# Patient Record
Sex: FEMALE | Race: WHITE | Employment: FULL TIME | URBAN - METROPOLITAN AREA
[De-identification: names, ages, dates, MRNs, and addresses within clinical notes are randomized per-mention and may not be internally consistent; named-entity substitution may affect disease eponyms.]

---

## 2017-06-28 PROBLEM — O42.90 PREMATURE RUPTURE OF MEMBRANES: Status: ACTIVE | Noted: 2017-06-28

## 2017-07-26 ENCOUNTER — HOSPITAL ENCOUNTER (OUTPATIENT)
Dept: GENERAL RADIOLOGY | Age: 22
Discharge: HOME | End: 2017-07-26
Attending: INTERNAL MEDICINE | Admitting: INTERNAL MEDICINE

## 2017-07-26 LAB
T4 FREE: 1.28 NG/DL (ref 0.9–1.8)
TSH HIGH SENSITIVITY: 2.4 UIU/ML (ref 0.27–4.2)

## 2017-11-10 ENCOUNTER — HOSPITAL ENCOUNTER (OUTPATIENT)
Dept: GENERAL RADIOLOGY | Age: 22
Discharge: HOME | End: 2017-11-10
Attending: INTERNAL MEDICINE | Admitting: INTERNAL MEDICINE

## 2017-11-10 LAB
T4 FREE: 1.22 NG/DL (ref 0.9–1.8)
TSH HIGH SENSITIVITY: 5.51 UIU/ML (ref 0.27–4.2)

## 2018-03-22 ENCOUNTER — HOSPITAL ENCOUNTER (OUTPATIENT)
Dept: GENERAL RADIOLOGY | Age: 23
Discharge: HOME | End: 2018-03-22
Attending: INTERNAL MEDICINE | Admitting: INTERNAL MEDICINE

## 2018-03-22 LAB
T4 FREE: 1.49 NG/DL (ref 0.9–1.8)
TSH HIGH SENSITIVITY: 0.35 UIU/ML (ref 0.27–4.2)

## 2019-10-04 RX ORDER — LEVOTHYROXINE SODIUM 112 UG/1
112 TABLET ORAL DAILY
COMMUNITY
End: 2021-01-25 | Stop reason: ALTCHOICE

## 2020-08-31 ENCOUNTER — OFFICE VISIT (OUTPATIENT)
Dept: FAMILY MEDICINE CLINIC | Age: 25
End: 2020-08-31
Payer: COMMERCIAL

## 2020-08-31 VITALS
OXYGEN SATURATION: 98 % | DIASTOLIC BLOOD PRESSURE: 64 MMHG | BODY MASS INDEX: 21.55 KG/M2 | HEIGHT: 63 IN | WEIGHT: 121.6 LBS | TEMPERATURE: 98.8 F | HEART RATE: 101 BPM | SYSTOLIC BLOOD PRESSURE: 106 MMHG

## 2020-08-31 PROCEDURE — 99214 OFFICE O/P EST MOD 30 MIN: CPT | Performed by: PHYSICIAN ASSISTANT

## 2020-08-31 RX ORDER — AMITRIPTYLINE HYDROCHLORIDE 25 MG/1
25 TABLET, FILM COATED ORAL NIGHTLY
Qty: 30 TABLET | Refills: 5 | Status: SHIPPED | OUTPATIENT
Start: 2020-08-31 | End: 2021-02-08

## 2020-08-31 ASSESSMENT — ENCOUNTER SYMPTOMS
VOMITING: 0
DIARRHEA: 0
NAUSEA: 0
EYE PAIN: 0
RHINORRHEA: 0
SHORTNESS OF BREATH: 0
COUGH: 0
ABDOMINAL PAIN: 0
SORE THROAT: 0
CONSTIPATION: 0

## 2020-08-31 ASSESSMENT — PATIENT HEALTH QUESTIONNAIRE - PHQ9
SUM OF ALL RESPONSES TO PHQ QUESTIONS 1-9: 0
SUM OF ALL RESPONSES TO PHQ QUESTIONS 1-9: 0
SUM OF ALL RESPONSES TO PHQ9 QUESTIONS 1 & 2: 0
1. LITTLE INTEREST OR PLEASURE IN DOING THINGS: 0
2. FEELING DOWN, DEPRESSED OR HOPELESS: 0

## 2020-08-31 NOTE — PROGRESS NOTES
8/31/2020    [facility]    Chief Complaint   Patient presents with    Annual Exam       HPI  History obtained from the patient. [facility] is a 25 y.o. female who presents today for annual exam.    Hypothyroidism - Compliant with Levothyroxine 112 mcg. She sees endocrinologist, Dr. Neha Ellsworth, every 3 months to get thyroid checked. Denies constipation, cold intolerance, and hair or nail changes. Health maintenance - Varicella vaccine, HPV vaccine, Pap smear  HIV screen, Chlamydia screen  She had a baby on June 23, 2019. Hasn't been back to OB since then. Migraines -the patient complains of frequent migraines, worst over her left temple. She states that she gets these migraines once or twice weekly, every week, and it has been this way for years. She states that she does have a history of impacted wisdom teeth, and she is working on scheduling an appointment to get these removed. No history of TMD.  She has an eye exam regularly. She states that when she gets a migraine, she takes 800 mg of Motrin, but \"the only thing that helps is sleep. \"  No association with time of day. REVIEW OF SYMPTOMS  Review of Systems   Constitutional: Negative for chills and fever. HENT: Negative for ear pain, rhinorrhea and sore throat. Eyes: Negative for pain and visual disturbance. Respiratory: Negative for cough and shortness of breath. Cardiovascular: Positive for palpitations. Negative for chest pain. Lowered synthroid and this resolved these   Gastrointestinal: Negative for abdominal pain, constipation, diarrhea, nausea and vomiting. Genitourinary: Negative for dysuria, frequency and urgency. Skin: Negative for rash. Neurological: Negative for dizziness and syncope. Psychiatric/Behavioral: Negative for suicidal ideas. The patient is not nervous/anxious.         PAST MEDICAL HISTORY  Past Medical History:   Diagnosis Date    Thyroid disease     hypothyroid       FAMILY HISTORY  Family History Problem Relation Age of Onset    High Blood Pressure Mother     Heart Disease Maternal Grandmother     High Blood Pressure Maternal Grandmother     Heart Attack Maternal Grandmother     Hypertension Maternal Grandfather     Cancer Maternal Grandfather         LEUKEMIA    Diabetes Maternal Grandfather        SOCIAL HISTORY  Social History     Socioeconomic History    Marital status: Single     Spouse name: None    Number of children: None    Years of education: None    Highest education level: None   Occupational History    None   Social Needs    Financial resource strain: None    Food insecurity     Worry: None     Inability: None    Transportation needs     Medical: None     Non-medical: None   Tobacco Use    Smoking status: Never Smoker    Smokeless tobacco: Never Used   Substance and Sexual Activity    Alcohol use: No    Drug use: No    Sexual activity: Yes   Lifestyle    Physical activity     Days per week: None     Minutes per session: None    Stress: None   Relationships    Social connections     Talks on phone: None     Gets together: None     Attends Mu-ism service: None     Active member of club or organization: None     Attends meetings of clubs or organizations: None     Relationship status: None    Intimate partner violence     Fear of current or ex partner: None     Emotionally abused: None     Physically abused: None     Forced sexual activity: None   Other Topics Concern    None   Social History Narrative    None        SURGICAL HISTORY  No past surgical history on file.     CURRENT MEDICATIONS  Current Outpatient Medications   Medication Sig Dispense Refill    amitriptyline (ELAVIL) 25 MG tablet Take 1 tablet by mouth nightly 30 tablet 5    levothyroxine (SYNTHROID) 112 MCG tablet Take 112 mcg by mouth Daily      ibuprofen (ADVIL;MOTRIN) 800 MG tablet Take 1 tablet by mouth every 8 hours as needed for Pain or Fever 30 tablet 2     No current facility-administered medications for this visit. ALLERGIES  No Known Allergies    RECENT LABS    No results found for: LABA1C  No results found for: EAG    No results found for: CHOL  No results found for: LDLCHOLESTEROL, [facility]    Lab Results   Component Value Date    WBC 22.3 (H) 06/28/2017    HGB 12.3 (L) 06/28/2017    HCT 34.9 (L) 06/28/2017    MCV 91.6 06/28/2017     (L) 06/28/2017       PHYSICAL EXAM  /64 (Site: Left Upper Arm, Position: Sitting, Cuff Size: Medium Adult)   Pulse 101   Temp 98.8 °F (37.1 °C)   Ht 5' 3\" (1.6 m)   Wt 121 lb 9.6 oz (55.2 kg)   LMP 08/10/2020 (Approximate)   SpO2 98%   BMI 21.54 kg/m²     Physical Exam  Constitutional:       Appearance: Normal appearance. HENT:      Head: Normocephalic and atraumatic. Eyes:      Comments: EOM grossly intact. Cardiovascular:      Rate and Rhythm: Normal rate and regular rhythm. Heart sounds: No murmur. No friction rub. No gallop. Pulmonary:      Effort: Pulmonary effort is normal.      Breath sounds: Normal breath sounds. No wheezing, rhonchi or rales. Skin:     General: Skin is warm and dry. Neurological:      Mental Status: She is alert and oriented to person, place, and time. Comments: Cranial nerves II-XII grossly intact   Psychiatric:         Mood and Affect: Mood normal.         Behavior: Behavior normal.         ASSESSMENT & PLAN  1. Hypothyroidism, unspecified type  We will update lab work. - TSH without Reflex; Future  - T4, Free; Future  - Comprehensive Metabolic Panel; Future  - CBC Auto Differential; Future  - Lipid Panel; Future    2. Migraine without status migrainosus, not intractable, unspecified migraine type  I explained to the patient the difference between abortive and preventative treatment for migraines, and I explained that more than 4 migraines per month warrants consideration of preventative therapy. I discussed various options, and the patient would like to try amitriptyline.   We will follow-up with her in 4 to 6 weeks to see how she is doing on this. - amitriptyline (ELAVIL) 25 MG tablet; Take 1 tablet by mouth nightly  Dispense: 30 tablet; Refill: 5    3. Screening for human immunodeficiency virus  Health maintenance requirement.  - HIV Screen; Future    4. Screening for chlamydial disease  Health maintenance requirement. - Chlamydia trachomatis DNA, Urine; Future          Return in about 6 weeks (around 10/12/2020) for Follow Up on New Migraine Tx.             Electronically signed by Antonio Hardin PA-C on 8/31/2020

## 2020-09-19 ENCOUNTER — HOSPITAL ENCOUNTER (OUTPATIENT)
Age: 25
Discharge: HOME | End: 2020-09-19
Payer: COMMERCIAL

## 2020-09-19 LAB
ALBUMIN SERPL-MCNC: 4.8 GM/DL (ref 3.4–5)
ALP BLD-CCNC: 57 IU/L (ref 40–128)
ALT SERPL-CCNC: 10 U/L (ref 10–40)
ANION GAP SERPL CALCULATED.3IONS-SCNC: 9 MMOL/L (ref 4–16)
AST SERPL-CCNC: 13 IU/L (ref 15–37)
BASOPHILS ABSOLUTE: 0.1 K/CU MM
BASOPHILS RELATIVE PERCENT: 1 % (ref 0–1)
BILIRUB SERPL-MCNC: 0.5 MG/DL (ref 0–1)
BUN BLDV-MCNC: 13 MG/DL (ref 6–23)
CALCIUM SERPL-MCNC: 9.4 MG/DL (ref 8.3–10.6)
CHLORIDE BLD-SCNC: 103 MMOL/L (ref 99–110)
CHOLESTEROL: 167 MG/DL
CO2: 26 MMOL/L (ref 21–32)
CREAT SERPL-MCNC: 0.8 MG/DL (ref 0.6–1.1)
DIFFERENTIAL TYPE: ABNORMAL
EOSINOPHILS ABSOLUTE: 0.2 K/CU MM
EOSINOPHILS RELATIVE PERCENT: 2.5 % (ref 0–3)
GFR AFRICAN AMERICAN: >60 ML/MIN/1.73M2
GFR NON-AFRICAN AMERICAN: >60 ML/MIN/1.73M2
GLUCOSE BLD-MCNC: 80 MG/DL (ref 70–99)
HCT VFR BLD CALC: 43.6 % (ref 37–47)
HDLC SERPL-MCNC: 71 MG/DL
HEMOGLOBIN: 14.4 GM/DL (ref 12.5–16)
IMMATURE NEUTROPHIL %: 0.2 % (ref 0–0.43)
LDL CHOLESTEROL DIRECT: 86 MG/DL
LYMPHOCYTES ABSOLUTE: 2.8 K/CU MM
LYMPHOCYTES RELATIVE PERCENT: 47 % (ref 24–44)
MCH RBC QN AUTO: 31.4 PG (ref 27–31)
MCHC RBC AUTO-ENTMCNC: 33 % (ref 32–36)
MCV RBC AUTO: 95.2 FL (ref 78–100)
MONOCYTES ABSOLUTE: 0.4 K/CU MM
MONOCYTES RELATIVE PERCENT: 7.1 % (ref 0–4)
NUCLEATED RBC %: 0 %
PDW BLD-RTO: 11.8 % (ref 11.7–14.9)
PLATELET # BLD: 186 K/CU MM (ref 140–440)
PMV BLD AUTO: 12.7 FL (ref 7.5–11.1)
POTASSIUM SERPL-SCNC: 4.3 MMOL/L (ref 3.5–5.1)
RBC # BLD: 4.58 M/CU MM (ref 4.2–5.4)
SEGMENTED NEUTROPHILS ABSOLUTE COUNT: 2.5 K/CU MM
SEGMENTED NEUTROPHILS RELATIVE PERCENT: 42.2 % (ref 36–66)
SODIUM BLD-SCNC: 138 MMOL/L (ref 135–145)
T4 FREE: 1.51 NG/DL (ref 0.9–1.8)
TOTAL IMMATURE NEUTOROPHIL: 0.01 K/CU MM
TOTAL NUCLEATED RBC: 0 K/CU MM
TOTAL PROTEIN: 7.1 GM/DL (ref 6.4–8.2)
TRIGL SERPL-MCNC: 48 MG/DL
TSH HIGH SENSITIVITY: 2.32 UIU/ML (ref 0.27–4.2)
WBC # BLD: 5.9 K/CU MM (ref 4–10.5)

## 2020-09-19 PROCEDURE — 84443 ASSAY THYROID STIM HORMONE: CPT

## 2020-09-19 PROCEDURE — 36415 COLL VENOUS BLD VENIPUNCTURE: CPT

## 2020-09-19 PROCEDURE — 87591 N.GONORRHOEAE DNA AMP PROB: CPT

## 2020-09-19 PROCEDURE — 87491 CHLMYD TRACH DNA AMP PROBE: CPT

## 2020-09-19 PROCEDURE — 87389 HIV-1 AG W/HIV-1&-2 AB AG IA: CPT

## 2020-09-19 PROCEDURE — 85025 COMPLETE CBC W/AUTO DIFF WBC: CPT

## 2020-09-19 PROCEDURE — 80061 LIPID PANEL: CPT

## 2020-09-19 PROCEDURE — 80053 COMPREHEN METABOLIC PANEL: CPT

## 2020-09-19 PROCEDURE — 83721 ASSAY OF BLOOD LIPOPROTEIN: CPT

## 2020-09-19 PROCEDURE — 84439 ASSAY OF FREE THYROXINE: CPT

## 2020-09-19 PROCEDURE — 87110 CHLAMYDIA CULTURE: CPT

## 2020-09-20 LAB — HIV SCREEN: NON REACTIVE

## 2020-09-22 NOTE — RESULT ENCOUNTER NOTE
Please call the patient let her know that I reviewed the results of her blood work. Her thyroid levels were normal, her blood count and metabolic panel looked good.   Thanks,  [place]

## 2020-09-23 LAB
CHLAMYDIA TRACHOMATIS AMPLIFIED DET: NEGATIVE
N GONORRHOEAE AMPLIFIED DET: NEGATIVE

## 2020-10-28 ENCOUNTER — TELEPHONE (OUTPATIENT)
Dept: FAMILY MEDICINE CLINIC | Age: 25
End: 2020-10-28

## 2021-01-25 ENCOUNTER — OFFICE VISIT (OUTPATIENT)
Dept: FAMILY MEDICINE CLINIC | Age: 26
End: 2021-01-25
Payer: COMMERCIAL

## 2021-01-25 VITALS
BODY MASS INDEX: 21.33 KG/M2 | DIASTOLIC BLOOD PRESSURE: 68 MMHG | HEART RATE: 106 BPM | WEIGHT: 120.4 LBS | TEMPERATURE: 98.2 F | SYSTOLIC BLOOD PRESSURE: 110 MMHG | OXYGEN SATURATION: 98 % | HEIGHT: 63 IN

## 2021-01-25 DIAGNOSIS — R00.2 PALPITATIONS: Primary | ICD-10-CM

## 2021-01-25 DIAGNOSIS — R00.0 TACHYCARDIA: ICD-10-CM

## 2021-01-25 DIAGNOSIS — G43.909 MIGRAINE WITHOUT STATUS MIGRAINOSUS, NOT INTRACTABLE, UNSPECIFIED MIGRAINE TYPE: ICD-10-CM

## 2021-01-25 PROCEDURE — 1111F DSCHRG MED/CURRENT MED MERGE: CPT | Performed by: PHYSICIAN ASSISTANT

## 2021-01-25 PROCEDURE — 99214 OFFICE O/P EST MOD 30 MIN: CPT | Performed by: PHYSICIAN ASSISTANT

## 2021-01-25 RX ORDER — LEVOTHYROXINE SODIUM 112 UG/1
1 TABLET ORAL DAILY
COMMUNITY
End: 2021-03-12 | Stop reason: SDUPTHER

## 2021-01-25 RX ORDER — DILTIAZEM HYDROCHLORIDE 120 MG/1
1 CAPSULE, COATED, EXTENDED RELEASE ORAL DAILY
COMMUNITY
Start: 2021-01-20 | End: 2021-02-08 | Stop reason: DRUGHIGH

## 2021-01-25 RX ORDER — SUMATRIPTAN 100 MG/1
100 TABLET, FILM COATED ORAL PRN
Qty: 9 TABLET | Refills: 0 | Status: SHIPPED | OUTPATIENT
Start: 2021-01-25 | End: 2021-02-08

## 2021-01-25 ASSESSMENT — PATIENT HEALTH QUESTIONNAIRE - PHQ9
SUM OF ALL RESPONSES TO PHQ QUESTIONS 1-9: 0
SUM OF ALL RESPONSES TO PHQ9 QUESTIONS 1 & 2: 0
1. LITTLE INTEREST OR PLEASURE IN DOING THINGS: 0

## 2021-01-25 NOTE — PROGRESS NOTES
1/25/2021    Milo Miramontes    Chief Complaint   Patient presents with    Follow-Up from Hospital     DX sinus tachacardia, 1-19 thru 1-20 , [facility] , EKG , pt states her HR flucuates suddenly       HPI  History obtained from the patient. Anjel Ayers is a 22 y.o. female who presents today for hospital follow up. The patient presented with intermittent palpitations X 2-3 weeks, as well as heaviness in her chest and SOB with these episodes and was admitted 1/19/21 - 1/21/21. Her EKG showed sinus tachycardia with a rate of 168. The patient notes that she's had palpitations for the last few weeks, but these become significantly more frequent and more uncomfortable, which is what brought her to the ED. They preformed an echo which was unremarkable. The patient was started on Diltiazem 120 mg ER daily and she has not had any episodes with chest heaviness since, though \"I do feel a little bit of the heart flutters still\" intermittently. She was instructed to stop drinking caffeine and to drink plenty of water, so she notes that she's not had any caffeine for the last few days. The patient notes that she stopped taking amitriptyline because it upset her stomach at night. She does still get migraines frequently, about the same rate as before trying the amitriptyline. REVIEW OF SYMPTOMS  Review of Systems   Constitutional: Negative for chills and fever. Respiratory: Negative for cough and shortness of breath. Gastrointestinal: Negative for diarrhea and vomiting.      PAST MEDICAL HISTORY  Past Medical History:   Diagnosis Date    Thyroid disease     hypothyroid       FAMILY HISTORY  Family History   Problem Relation Age of Onset    High Blood Pressure Mother     Heart Disease Maternal Grandmother     High Blood Pressure Maternal Grandmother     Heart Attack Maternal Grandmother     Hypertension Maternal Grandfather     Cancer Maternal Grandfather         LEUKEMIA    Diabetes Maternal Grandfather SOCIAL HISTORY  Social History     Socioeconomic History    Marital status: Single     Spouse name: Not on file    Number of children: Not on file    Years of education: Not on file    Highest education level: Not on file   Occupational History    Not on file   Social Needs    Financial resource strain: Not on file    Food insecurity     Worry: Not on file     Inability: Not on file    Transportation needs     Medical: Not on file     Non-medical: Not on file   Tobacco Use    Smoking status: Never Smoker    Smokeless tobacco: Never Used   Substance and Sexual Activity    Alcohol use: No    Drug use: No    Sexual activity: Yes   Lifestyle    Physical activity     Days per week: Not on file     Minutes per session: Not on file    Stress: Not on file   Relationships    Social connections     Talks on phone: Not on file     Gets together: Not on file     Attends Sabianist service: Not on file     Active member of club or organization: Not on file     Attends meetings of clubs or organizations: Not on file     Relationship status: Not on file    Intimate partner violence     Fear of current or ex partner: Not on file     Emotionally abused: Not on file     Physically abused: Not on file     Forced sexual activity: Not on file   Other Topics Concern    Not on file   Social History Narrative    Not on file        SURGICAL HISTORY  No past surgical history on file. CURRENT MEDICATIONS  Current Outpatient Medications   Medication Sig Dispense Refill    dilTIAZem (CARDIZEM CD) 120 MG extended release capsule Take 1 capsule by mouth daily      levothyroxine (UNITHROID) 112 MCG tablet Take 1 tablet by mouth daily      SUMAtriptan (IMITREX) 100 MG tablet Take 1 tablet by mouth as needed for Migraine Administer at first sign of headache. If initial dose was partially effective or headache recurs, may repeat a dose 2 hours later.  Limit use to no more than 10 days of use per month to avoid medication overuse headache. 9 tablet 0    ibuprofen (ADVIL;MOTRIN) 800 MG tablet Take 1 tablet by mouth every 8 hours as needed for Pain or Fever 30 tablet 2    amitriptyline (ELAVIL) 25 MG tablet Take 1 tablet by mouth nightly (Patient not taking: Reported on 1/25/2021) 30 tablet 5     No current facility-administered medications for this visit. ALLERGIES  No Known Allergies    RECENT LABS    No results found for: LABA1C  No results found for: EAG    Lab Results   Component Value Date    CHOL 167 09/19/2020     No results found for: Екатерина More [address]    Lab Results   Component Value Date    WBC 5.9 09/19/2020    HGB 14.4 09/19/2020    HCT 43.6 09/19/2020    MCV 95.2 09/19/2020     09/19/2020       PHYSICAL EXAM  /68   Pulse 106   Temp 98.2 °F (36.8 °C)   Ht 5' 3\" (1.6 m)   Wt 120 lb 6.4 oz (54.6 kg)   SpO2 98%   BMI 21.33 kg/m²     Physical Exam  Constitutional:       Appearance: Normal appearance. HENT:      Head: Normocephalic and atraumatic. Eyes:      Comments: EOM grossly intact. Cardiovascular:      Rate and Rhythm: Normal rate and regular rhythm. Heart sounds: No murmur. No friction rub. No gallop. Pulmonary:      Effort: Pulmonary effort is normal.      Breath sounds: Normal breath sounds. No wheezing, rhonchi or rales. Skin:     General: Skin is warm and dry. Neurological:      Mental Status: She is alert and oriented to person, place, and time. Comments: Cranial nerves II-XII grossly intact   Psychiatric:         Mood and Affect: Mood normal.         Behavior: Behavior normal.         ASSESSMENT & PLAN  1. Palpitations  Referred the patient to cardiology for evaluation. I answered the patients questions and explained that a [place] monitor might be one of their first steps in evaluation.  Encouraged her to inquire about propranolol, as this medication might control her rate and also provide benefits of migraine prevention.   - Ambulatory referral to

## 2021-01-26 ENCOUNTER — TELEPHONE (OUTPATIENT)
Dept: CARDIOLOGY CLINIC | Age: 26
End: 2021-01-26

## 2021-02-01 ENCOUNTER — TELEPHONE (OUTPATIENT)
Dept: CARDIOLOGY CLINIC | Age: 26
End: 2021-02-01

## 2021-02-08 ENCOUNTER — INITIAL CONSULT (OUTPATIENT)
Dept: CARDIOLOGY CLINIC | Age: 26
End: 2021-02-08
Payer: COMMERCIAL

## 2021-02-08 VITALS
HEIGHT: 63 IN | HEART RATE: 105 BPM | DIASTOLIC BLOOD PRESSURE: 72 MMHG | SYSTOLIC BLOOD PRESSURE: 110 MMHG | WEIGHT: 118.2 LBS | BODY MASS INDEX: 20.94 KG/M2

## 2021-02-08 DIAGNOSIS — R00.0 TACHYCARDIA: Primary | ICD-10-CM

## 2021-02-08 PROCEDURE — G8484 FLU IMMUNIZE NO ADMIN: HCPCS | Performed by: INTERNAL MEDICINE

## 2021-02-08 PROCEDURE — 1036F TOBACCO NON-USER: CPT | Performed by: INTERNAL MEDICINE

## 2021-02-08 PROCEDURE — G8427 DOCREV CUR MEDS BY ELIG CLIN: HCPCS | Performed by: INTERNAL MEDICINE

## 2021-02-08 PROCEDURE — 99204 OFFICE O/P NEW MOD 45 MIN: CPT | Performed by: INTERNAL MEDICINE

## 2021-02-08 PROCEDURE — G8420 CALC BMI NORM PARAMETERS: HCPCS | Performed by: INTERNAL MEDICINE

## 2021-02-08 RX ORDER — DILTIAZEM HYDROCHLORIDE 240 MG/1
240 CAPSULE, COATED, EXTENDED RELEASE ORAL DAILY
Qty: 30 CAPSULE | Refills: 0 | Status: SHIPPED | OUTPATIENT
Start: 2021-02-08 | End: 2021-03-09 | Stop reason: ALTCHOICE

## 2021-02-08 RX ORDER — DILTIAZEM HYDROCHLORIDE 120 MG/1
240 CAPSULE, COATED, EXTENDED RELEASE ORAL DAILY
Qty: 30 CAPSULE | Refills: 3 | Status: SHIPPED
Start: 2021-02-08 | End: 2021-02-08 | Stop reason: SDUPTHER

## 2021-02-08 NOTE — LETTER
Vern Miramontes  1995  [id number]    Have you had any Chest Pain that is not new? - No    ? DO EKG IF: Patient has a Heart Rate above 100 or below 40     CAD (Coronary Artery Disease) patient should have one on file every 6 months        Have you had any Shortness of Breath - No      Have you had any dizziness - No      ? Sitting wait 5 minutes do supine (laying down) wait 5 minutes then do standing - log each in \"vitals\" area in Epic  ? Be sure to ask what symptoms they are having if they get dizzy while completing ortho stats such as room spinning, nausea, etc.    Have you had any palpitations that are not new?  - Yes  If Yes DO EKG - Do you feel your heart fluttering  How long does it last -   seconds       Do you have any edema - No       Do you have a surgery or procedure scheduled in the near future - No

## 2021-02-08 NOTE — PROGRESS NOTES
Dariela Sousa  is a  New patient  ,22 y.o.   female here for evaluation of the following chief complaint(s):    tachycardia        SUBJECTIVE/OBJECTIVE:  HPI : h/o  tachycardia now here  [facility] Review of Systems    palpitations    Vitals:    02/08/21 1530   BP: 110/72   Pulse: 105   Weight: 118 lb 3.2 oz (53.6 kg)   Height: 5' 3\" (1.6 m)     /72   Pulse 105   Ht 5' 3\" (1.6 m)   Wt 118 lb 3.2 oz (53.6 kg)   BMI 20.94 kg/m²   No flowsheet data found. Wt Readings from Last 3 Encounters:   02/08/21 118 lb 3.2 oz (53.6 kg)   01/25/21 120 lb 6.4 oz (54.6 kg)   08/31/20 121 lb 9.6 oz (55.2 kg)     Body mass index is 20.94 kg/m². Physical Exam     Neck: JVD     Lungs : clear    Cardio : Si and S2 audilble      Ext: edema      All pertinent data reviewed      Meds : reviewed     1/21  1. Normal left ventricle size and systolic function; estimated LVEF   60-65%. Normal diastolic parameters. 2. Normal right ventricle size and systolic function. 3. No hemodynamically significant valvular disease. 4. No pericardial effusion. Tests ordered        ASSESSMENT/PLAN:    - tachycardia: on CCB, ETT    increase CCB   seenn by EP at [address]  is a 22 y.o. female with a past medical history of hypothyroidism on 112 mcg levothyroxine. Karena David originally presented to [facility] on 1/19/2020. She presents with 2 weeks of intermittent palpitations. Her only symptoms associated with her palpitations are chest pain and shortness of breath. She had a negative troponin times 2 and a normal D-dimer. Her thyroid function tests were normal. She had palpitations while in the CDU and per telemetry had sinus tachycardia at the time that gradually increased and gradually decreased. The P wave morphology is similar at both lower and higher heart rates. The EKG during tachycardia shows sinus tachycardia with a rate of 168.  She denies any drug use. She does have a family history of atrial fibrillation. Her tachycardia improved with IV fluids. Patent states she was sitting in bed last evening when the event occurred. She felt her typical symptoms during that period. She denies any personal or family history of cardiac disease. She denies any sudden cardiac death in her family. ASSESSMENT AND PLAN   Sandee Aldridge is a 22 y.o. female who has the following active issues that are listed below:    1. Palpitations-sinus tachycardia on telemetry and EKG. EKG shows sinus tachycardia rate 168. 2. Hypothyroidism on 112 mcg levothyroxine    Recommendations  -Obtain echocardiogram to assess LV function and ensure structurally normal heart  -Recommend Diltiazem 120 mg daily for suppression of sinus tachycardia events   -Discussed with patient to avoid alcohol and caffeine, encouraged aggressive hydration, increase salt intake, and frequent intake of small meals. This consult was discussed with Dr. Reyes Poles the attending physician. If you have any questions or need any further information, please feel free to contact the EP Consult Service. Thank you for allowing us to participate in the care of Sandee Aldridge. Mercedes Hinojosa MD  Fellow   Cardiac Electrophysiology       - on synthoid    An electronic signature was used to authenticate this note.     --Margaret Meckel, MD

## 2021-02-16 ENCOUNTER — NURSE ONLY (OUTPATIENT)
Dept: CARDIOLOGY CLINIC | Age: 26
End: 2021-02-16
Payer: COMMERCIAL

## 2021-02-16 DIAGNOSIS — R00.0 TACHYCARDIA: Primary | ICD-10-CM

## 2021-02-16 PROCEDURE — 93242 EXT ECG>48HR<7D RECORDING: CPT | Performed by: INTERNAL MEDICINE

## 2021-02-16 NOTE — PROGRESS NOTES
72 hour holter monitor Dinorah@[employer] PM  for Tachycardia  Serial # [id number] . Instructed patient on monitor and proper use. Instructed on diary. When to remove and bring it back. Must leave the holter monitor on  without removing for the duration of time ordered. Answered all questions the patient had. Instructed patient to call [facility] at [phone] with any questions or concerns with the monitor.

## 2021-02-19 NOTE — TELEPHONE ENCOUNTER
Pt wants refill on Diltiazem 120mg. She has never had to take the 240 mg  Ov 3/21 pt is out. Call to [facility] on [address]   Call pt to let her know. If Dr will not call in 120 mg she will need 240 mg due to new pharm.

## 2021-02-25 ENCOUNTER — PROCEDURE VISIT (OUTPATIENT)
Dept: CARDIOLOGY CLINIC | Age: 26
End: 2021-02-25
Payer: COMMERCIAL

## 2021-02-25 VITALS
WEIGHT: 118 LBS | SYSTOLIC BLOOD PRESSURE: 110 MMHG | HEIGHT: 63 IN | DIASTOLIC BLOOD PRESSURE: 60 MMHG | BODY MASS INDEX: 20.91 KG/M2 | HEART RATE: 96 BPM | TEMPERATURE: 97.3 F

## 2021-02-25 DIAGNOSIS — R94.31 ABNORMAL EKG: ICD-10-CM

## 2021-02-25 DIAGNOSIS — R00.2 PALPITATIONS: ICD-10-CM

## 2021-02-25 DIAGNOSIS — Z82.49 FAMILY HISTORY OF CORONARY ARTERY DISEASE: ICD-10-CM

## 2021-02-25 DIAGNOSIS — R07.89 CHEST HEAVINESS: ICD-10-CM

## 2021-02-25 DIAGNOSIS — R00.0 TACHYCARDIA: Primary | ICD-10-CM

## 2021-02-25 PROCEDURE — 93015 CV STRESS TEST SUPVJ I&R: CPT | Performed by: INTERNAL MEDICINE

## 2021-02-25 RX ORDER — DILTIAZEM HYDROCHLORIDE 120 MG/1
120 CAPSULE, COATED, EXTENDED RELEASE ORAL DAILY
Qty: 90 CAPSULE | Refills: 1 | Status: SHIPPED | OUTPATIENT
Start: 2021-02-25 | End: 2021-03-09 | Stop reason: ALTCHOICE

## 2021-02-26 PROCEDURE — 93244 EXT ECG>48HR<7D REV&INTERPJ: CPT | Performed by: INTERNAL MEDICINE

## 2021-03-02 ENCOUNTER — TELEPHONE (OUTPATIENT)
Dept: CARDIOLOGY CLINIC | Age: 26
End: 2021-03-02

## 2021-03-02 ENCOUNTER — OFFICE VISIT (OUTPATIENT)
Dept: CARDIOLOGY CLINIC | Age: 26
End: 2021-03-02
Payer: COMMERCIAL

## 2021-03-02 VITALS
HEIGHT: 63 IN | DIASTOLIC BLOOD PRESSURE: 88 MMHG | BODY MASS INDEX: 21.23 KG/M2 | WEIGHT: 119.8 LBS | TEMPERATURE: 97.7 F | SYSTOLIC BLOOD PRESSURE: 122 MMHG | HEART RATE: 90 BPM

## 2021-03-02 DIAGNOSIS — E03.9 HYPOTHYROIDISM, UNSPECIFIED TYPE: ICD-10-CM

## 2021-03-02 DIAGNOSIS — R00.2 PALPITATIONS: ICD-10-CM

## 2021-03-02 DIAGNOSIS — R00.2 PALPITATIONS: Primary | ICD-10-CM

## 2021-03-02 LAB — TSH REFLEX: 2.95 UIU/ML (ref 0.27–4.2)

## 2021-03-02 PROCEDURE — 99213 OFFICE O/P EST LOW 20 MIN: CPT | Performed by: NURSE PRACTITIONER

## 2021-03-02 PROCEDURE — 1036F TOBACCO NON-USER: CPT | Performed by: NURSE PRACTITIONER

## 2021-03-02 PROCEDURE — G8420 CALC BMI NORM PARAMETERS: HCPCS | Performed by: NURSE PRACTITIONER

## 2021-03-02 PROCEDURE — G8484 FLU IMMUNIZE NO ADMIN: HCPCS | Performed by: NURSE PRACTITIONER

## 2021-03-02 PROCEDURE — 93000 ELECTROCARDIOGRAM COMPLETE: CPT | Performed by: NURSE PRACTITIONER

## 2021-03-02 PROCEDURE — G8427 DOCREV CUR MEDS BY ELIG CLIN: HCPCS | Performed by: NURSE PRACTITIONER

## 2021-03-02 ASSESSMENT — ENCOUNTER SYMPTOMS
SHORTNESS OF BREATH: 0
ORTHOPNEA: 0

## 2021-03-02 NOTE — PROGRESS NOTES
3/2/2021  Primary cardiologist: Dr. Lucinda Diggs  is an established 22 y.o.  female here for follow-up on cavitations      SUBJECTIVE/OBJECTIVE:  HPI :   [place] reports she woke up on Saturday with palpitations. Unfortunate she did not have her iWatch on or to give her heart rate or rhythm. States palpitations lasted proximately 30 minutes. She has had no further symptoms since Saturday. She denies any caffeine or drug use. She was seen in [facility] on January 19 with complaints of palpitations. At that time she was noted to have sinus tachycardia with rate of 156. Tachycardia did get better with IV hydration. She was started on low-dose Cardizem per EP. She has a history of hypothyroid and is on synthroid. Review of Systems   Constitution: Negative for diaphoresis and malaise/fatigue. Cardiovascular: Positive for palpitations. Negative for chest pain, claudication, dyspnea on exertion, irregular heartbeat, leg swelling, near-syncope, orthopnea and paroxysmal nocturnal dyspnea. Respiratory: Negative for shortness of breath. Neurological: Negative for dizziness and light-headedness. Vitals:    03/02/21 1411   BP: 122/88   Site: Left Upper Arm   Position: Sitting   Cuff Size: Medium Adult   Pulse: 90   Weight: 119 lb 12.8 oz (54.3 kg)   Height: 5' 3\" (1.6 m)     /88 (Site: Left Upper Arm, Position: Sitting, Cuff Size: Medium Adult)   Pulse 90   Ht 5' 3\" (1.6 m)   Wt 119 lb 12.8 oz (54.3 kg)   BMI 21.22 kg/m²   No flowsheet data found. Wt Readings from Last 3 Encounters:   03/02/21 119 lb 12.8 oz (54.3 kg)   02/25/21 118 lb (53.5 kg)   02/08/21 118 lb 3.2 oz (53.6 kg)     Body mass index is 21.22 kg/m².     Physical Exam :     Neck: supple,  no carotid bruit appreciated, JVD not appreciated    Respiratory:  Normal breath sounds, No respiratory distress, No wheezing    Cardiovascular:  S1 S2 appreciated, Regular rate, regular rhythm,  no murmur appreciated, No rubs appreciated, No gallops appreciated, No chest tenderness. Extremities:  no edema to the lower extremities    All pertinent data reviewed and discussed with patient including:         ASSESSMENT/PLAN:    1. Palpitations  H/o ST  Was started on Cardizem per EP at [address] [place] [address]. Her Cardizem was recently increased for further assistance with tachycardia. Today's EKG shows sinus rhythm rate 89. She has history of having hypothyroid we will check TSH. At this time continue with Cardizem. Recommend to continue avoid caffeine, nicotine or any stimulants. Keep self well-hydrated. Follow-up as scheduled  If palpitations consider addition of BB and referral to EP    Discussed case with EP- recommends event monitor  Will inform patient and have event monitor placed     - EKG 12 lead; Standing  - EKG 12 lead  - EKG 12 lead        Medications reviewed and confirmed with patient     Tests ordered:  ekg    OV in as scheduled     Signed:  Shahid Ma, 3/2/2021, 2:26 PM    An electronic signature was used to authenticate this note.

## 2021-03-02 NOTE — TELEPHONE ENCOUNTER
Patient had 30 minute tachycardia episode Saturday night. Woke from sleep. Patient unsure of HR.  Requesting OV to discuss

## 2021-03-02 NOTE — TELEPHONE ENCOUNTER
Patient called stated satyurday she had palpitations  That woke her up and her heart rate was extremely   High

## 2021-03-05 ENCOUNTER — NURSE ONLY (OUTPATIENT)
Dept: CARDIOLOGY CLINIC | Age: 26
End: 2021-03-05
Payer: COMMERCIAL

## 2021-03-05 DIAGNOSIS — R00.0 TACHYCARDIA: Primary | ICD-10-CM

## 2021-03-05 PROCEDURE — 93270 REMOTE 30 DAY ECG REV/REPORT: CPT | Performed by: INTERNAL MEDICINE

## 2021-03-05 NOTE — PROGRESS NOTES
30  days e-cardio monitor placed.  # [id number]. Instructed patient on how to record the event and to call monitoring center at [phone] if any problems arise. Instructed patient that the adhesive strip and battery should be changed every 7 days or if they no longer adhere to the skin. Patient to mail package after the monitor has ended. Patient verbalized understanding.

## 2021-03-08 ENCOUNTER — APPOINTMENT (OUTPATIENT)
Dept: GENERAL RADIOLOGY | Age: 26
End: 2021-03-08
Payer: COMMERCIAL

## 2021-03-08 ENCOUNTER — APPOINTMENT (OUTPATIENT)
Dept: CT IMAGING | Age: 26
End: 2021-03-08
Payer: COMMERCIAL

## 2021-03-08 ENCOUNTER — HOSPITAL ENCOUNTER (EMERGENCY)
Age: 26
Discharge: HOME | End: 2021-03-09
Attending: EMERGENCY MEDICINE
Payer: COMMERCIAL

## 2021-03-08 VITALS
HEIGHT: 63 IN | TEMPERATURE: 99 F | WEIGHT: 118 LBS | BODY MASS INDEX: 20.91 KG/M2 | RESPIRATION RATE: 18 BRPM | OXYGEN SATURATION: 98 % | SYSTOLIC BLOOD PRESSURE: 138 MMHG | HEART RATE: 103 BPM | DIASTOLIC BLOOD PRESSURE: 86 MMHG

## 2021-03-08 DIAGNOSIS — R07.9 CHEST PAIN, UNSPECIFIED TYPE: Primary | ICD-10-CM

## 2021-03-08 DIAGNOSIS — R00.2 PALPITATIONS: ICD-10-CM

## 2021-03-08 LAB
ALBUMIN SERPL-MCNC: 4.7 GM/DL (ref 3.4–5)
ALP BLD-CCNC: 59 IU/L (ref 40–128)
ALT SERPL-CCNC: 11 U/L (ref 10–40)
ANION GAP SERPL CALCULATED.3IONS-SCNC: 14 MMOL/L (ref 4–16)
AST SERPL-CCNC: 18 IU/L (ref 15–37)
BASOPHILS ABSOLUTE: 0.1 K/CU MM
BASOPHILS RELATIVE PERCENT: 0.7 % (ref 0–1)
BILIRUB SERPL-MCNC: 0.3 MG/DL (ref 0–1)
BUN BLDV-MCNC: 13 MG/DL (ref 6–23)
CALCIUM SERPL-MCNC: 9.4 MG/DL (ref 8.3–10.6)
CHLORIDE BLD-SCNC: 102 MMOL/L (ref 99–110)
CO2: 22 MMOL/L (ref 21–32)
CREAT SERPL-MCNC: 0.7 MG/DL (ref 0.6–1.1)
DIFFERENTIAL TYPE: ABNORMAL
EOSINOPHILS ABSOLUTE: 0.1 K/CU MM
EOSINOPHILS RELATIVE PERCENT: 1.4 % (ref 0–3)
GFR AFRICAN AMERICAN: >60 ML/MIN/1.73M2
GFR NON-AFRICAN AMERICAN: >60 ML/MIN/1.73M2
GLUCOSE BLD-MCNC: 120 MG/DL (ref 70–99)
GONADOTROPIN, CHORIONIC (HCG) QUANT: <0.5 UIU/ML
HCT VFR BLD CALC: 43.2 % (ref 37–47)
HEMOGLOBIN: 14.4 GM/DL (ref 12.5–16)
IMMATURE NEUTROPHIL %: 0.3 % (ref 0–0.43)
LYMPHOCYTES ABSOLUTE: 4.2 K/CU MM
LYMPHOCYTES RELATIVE PERCENT: 43.7 % (ref 24–44)
MAGNESIUM: 1.8 MG/DL (ref 1.8–2.4)
MCH RBC QN AUTO: 31.2 PG (ref 27–31)
MCHC RBC AUTO-ENTMCNC: 33.3 % (ref 32–36)
MCV RBC AUTO: 93.5 FL (ref 78–100)
MONOCYTES ABSOLUTE: 0.6 K/CU MM
MONOCYTES RELATIVE PERCENT: 6.2 % (ref 0–4)
NUCLEATED RBC %: 0 %
PDW BLD-RTO: 11.7 % (ref 11.7–14.9)
PLATELET # BLD: 214 K/CU MM (ref 140–440)
PMV BLD AUTO: 13.1 FL (ref 7.5–11.1)
POTASSIUM SERPL-SCNC: 3.5 MMOL/L (ref 3.5–5.1)
PRO-BNP: 28.57 PG/ML
RBC # BLD: 4.62 M/CU MM (ref 4.2–5.4)
SEGMENTED NEUTROPHILS ABSOLUTE COUNT: 4.6 K/CU MM
SEGMENTED NEUTROPHILS RELATIVE PERCENT: 47.7 % (ref 36–66)
SODIUM BLD-SCNC: 138 MMOL/L (ref 135–145)
TOTAL IMMATURE NEUTOROPHIL: 0.03 K/CU MM
TOTAL NUCLEATED RBC: 0 K/CU MM
TOTAL PROTEIN: 7.4 GM/DL (ref 6.4–8.2)
TROPONIN T: <0.01 NG/ML
WBC # BLD: 9.7 K/CU MM (ref 4–10.5)

## 2021-03-08 PROCEDURE — 93005 ELECTROCARDIOGRAM TRACING: CPT | Performed by: EMERGENCY MEDICINE

## 2021-03-08 PROCEDURE — 99284 EMERGENCY DEPT VISIT MOD MDM: CPT

## 2021-03-08 PROCEDURE — 84484 ASSAY OF TROPONIN QUANT: CPT

## 2021-03-08 PROCEDURE — 71045 X-RAY EXAM CHEST 1 VIEW: CPT

## 2021-03-08 PROCEDURE — 80053 COMPREHEN METABOLIC PANEL: CPT

## 2021-03-08 PROCEDURE — 2580000003 HC RX 258: Performed by: EMERGENCY MEDICINE

## 2021-03-08 PROCEDURE — 71275 CT ANGIOGRAPHY CHEST: CPT

## 2021-03-08 PROCEDURE — 36415 COLL VENOUS BLD VENIPUNCTURE: CPT

## 2021-03-08 PROCEDURE — 6360000004 HC RX CONTRAST MEDICATION: Performed by: EMERGENCY MEDICINE

## 2021-03-08 PROCEDURE — 85025 COMPLETE CBC W/AUTO DIFF WBC: CPT

## 2021-03-08 PROCEDURE — 83880 ASSAY OF NATRIURETIC PEPTIDE: CPT

## 2021-03-08 PROCEDURE — 83735 ASSAY OF MAGNESIUM: CPT

## 2021-03-08 PROCEDURE — 84702 CHORIONIC GONADOTROPIN TEST: CPT

## 2021-03-08 RX ORDER — 0.9 % SODIUM CHLORIDE 0.9 %
500 INTRAVENOUS SOLUTION INTRAVENOUS ONCE
Status: COMPLETED | OUTPATIENT
Start: 2021-03-08 | End: 2021-03-09

## 2021-03-08 RX ADMIN — SODIUM CHLORIDE 500 ML: 9 INJECTION, SOLUTION INTRAVENOUS at 23:18

## 2021-03-08 RX ADMIN — IOPAMIDOL 75 ML: 755 INJECTION, SOLUTION INTRAVENOUS at 23:49

## 2021-03-08 ASSESSMENT — PAIN DESCRIPTION - LOCATION: LOCATION: CHEST

## 2021-03-08 ASSESSMENT — PAIN SCALES - GENERAL: PAINLEVEL_OUTOF10: 7

## 2021-03-08 ASSESSMENT — PAIN DESCRIPTION - FREQUENCY: FREQUENCY: CONTINUOUS

## 2021-03-08 ASSESSMENT — PAIN DESCRIPTION - ONSET: ONSET: ON-GOING

## 2021-03-08 ASSESSMENT — PAIN DESCRIPTION - DESCRIPTORS: DESCRIPTORS: SHARP

## 2021-03-09 ENCOUNTER — OFFICE VISIT (OUTPATIENT)
Dept: CARDIOLOGY CLINIC | Age: 26
End: 2021-03-09
Payer: COMMERCIAL

## 2021-03-09 VITALS
HEART RATE: 92 BPM | BODY MASS INDEX: 21.09 KG/M2 | WEIGHT: 119 LBS | SYSTOLIC BLOOD PRESSURE: 114 MMHG | HEIGHT: 63 IN | TEMPERATURE: 97 F | DIASTOLIC BLOOD PRESSURE: 70 MMHG

## 2021-03-09 DIAGNOSIS — R00.0 TACHYCARDIA: Primary | ICD-10-CM

## 2021-03-09 PROCEDURE — 1036F TOBACCO NON-USER: CPT | Performed by: NURSE PRACTITIONER

## 2021-03-09 PROCEDURE — 93010 ELECTROCARDIOGRAM REPORT: CPT | Performed by: INTERNAL MEDICINE

## 2021-03-09 PROCEDURE — G8427 DOCREV CUR MEDS BY ELIG CLIN: HCPCS | Performed by: NURSE PRACTITIONER

## 2021-03-09 PROCEDURE — 99213 OFFICE O/P EST LOW 20 MIN: CPT | Performed by: NURSE PRACTITIONER

## 2021-03-09 PROCEDURE — G8420 CALC BMI NORM PARAMETERS: HCPCS | Performed by: NURSE PRACTITIONER

## 2021-03-09 PROCEDURE — G8484 FLU IMMUNIZE NO ADMIN: HCPCS | Performed by: NURSE PRACTITIONER

## 2021-03-09 RX ORDER — DILTIAZEM HYDROCHLORIDE 360 MG/1
360 CAPSULE, EXTENDED RELEASE ORAL DAILY
Qty: 30 CAPSULE | Refills: 3 | Status: SHIPPED | OUTPATIENT
Start: 2021-03-09 | End: 2021-06-01

## 2021-03-09 RX ORDER — SUMATRIPTAN 100 MG/1
100 TABLET, FILM COATED ORAL
COMMUNITY
End: 2021-04-20

## 2021-03-09 RX ORDER — DILTIAZEM HYDROCHLORIDE 360 MG/1
360 CAPSULE, EXTENDED RELEASE ORAL DAILY
Qty: 30 CAPSULE | Refills: 3 | Status: SHIPPED | OUTPATIENT
Start: 2021-03-09 | End: 2021-03-09 | Stop reason: SDUPTHER

## 2021-03-09 ASSESSMENT — ENCOUNTER SYMPTOMS
ORTHOPNEA: 0
SHORTNESS OF BREATH: 0

## 2021-03-09 NOTE — LETTER
Donna Miramontes  1995  [id number]    Have you had any Chest Pain that is not new? - Yes  If Yes DO EKG - How does it feel - Sharp Pain   How long does the pain last - 6 seconds    How long have you been having the pain - Day's   Did you take a no   And did it relieve the pain - n/a    Have you had any Shortness of Breath - Yes  If Yes - When on exertion    Have you had any dizziness - No    Have you had any palpitations that are not new? - Yes  If Yes DO EKG - Do you feel your heart pounding  How long does it last - .10  minutes     Is the patient on any of the following medications - no    Do you have any edema - swelling in No      Vein \"LEG PROBLEM Questionnaire\"  1. Do you have prominent leg veins? No   2. Do you have any skin discoloration? No  3. Do you have any healed or active sores? No  4. Do you have swelling of the legs? No  5. Do you have a family history of varicose veins? No  6. Does your profession involve pro-longed        standing or heavy lifting? No  7. Have you been fighting overweight problems? No  8. Do you have restless legs? No  9. Do you have any night time cramps? No  10.  Do you have any of the following in your legs:        no     Do you have a surgery or procedure scheduled in the near future - No

## 2021-03-09 NOTE — ED NOTES
rapid heart rate last couple of months. has been wearing a holter monitor. sees Dr. Isidro Pop.  reports HR of 160 prior to EMS arrival. EMS 's, Sinus Tach. c/o sharp pain to L chest.   20G iv L A/C    Pt states has had holter on for 1 week     Jalil Hernandez RN  03/08/21 2192

## 2021-03-09 NOTE — ED NOTES
Bed: ED-33  Expected date:   Expected time:   Means of arrival:   Comments:  Jsaon Schulz  03/08/21 2623

## 2021-03-09 NOTE — PROGRESS NOTES
3/9/2021  Primary cardiologist: Dr. Gregory Rdz  is an established 22 y.o.  female here for follow-up on recent ED visit. SUBJECTIVE/OBJECTIVE:  HPI :   Dariela Sousa reports she had an episode of palpitations, her HR at home was 160 bpm. She presented to the ED via EMS. EMS HR reported 140 Sinus tach. In the emergency room she was given fluids. Heart rate came down to low 100s. She was seen on 03/02/2021 and placed on a 30 day event monitor. She becomes very anxious with cardio. She notes left arm pain and left arm numbness when she becomes tachycardic. She notes chest pain is a sharp anterior chest wall pain lasting 6 seconds. She notes off and on throughout the day. She also notes shortness of breath with exertion. Review of Systems   Constitution: Negative for diaphoresis and malaise/fatigue. Cardiovascular: Positive for chest pain and palpitations. Negative for claudication, dyspnea on exertion, irregular heartbeat, leg swelling, near-syncope, orthopnea and paroxysmal nocturnal dyspnea. Respiratory: Negative for shortness of breath. Neurological: Negative for dizziness and light-headedness. Vitals:    03/09/21 1338   BP: 114/70   Pulse: 92   Weight: 119 lb (54 kg)   Height: 5' 3\" (1.6 m)     /70   Pulse 92   Ht 5' 3\" (1.6 m)   Wt 119 lb (54 kg)   LMP 03/01/2021   BMI 21.08 kg/m²   No flowsheet data found. Wt Readings from Last 3 Encounters:   03/09/21 119 lb (54 kg)   03/08/21 118 lb (53.5 kg)   03/02/21 119 lb 12.8 oz (54.3 kg)     Body mass index is 21.08 kg/m². Physical Exam :     Neck: supple,  no carotid bruit appreciated, JVD not appreciated    Respiratory:  Normal breath sounds, No respiratory distress, No wheezing    Cardiovascular:  S1 S2 appreciated, Regular rate, regular rhythm,  no murmur appreciated, No rubs appreciated, No gallops appreciated, No chest tenderness.      Extremities:  no edema to the lower extremities    All pertinent data reviewed and discussed with patient including:         ASSESSMENT/PLAN:    Tachycardia/palpitations  She presented to the emergency room yesterday after having episode of tachycardia and palpitations. She reported heart rate of 160 and EMS report heart rate of 144 per review of current event monitor on patient's heart rate max 141-appears to be sinus tachycardia. Will increase Cardizem  Continue to wear event monitor and further evaluation treatment we recommend upon completion monitor  Discussed with EP as well and further recommendations will be given to patient    Chest pain   Chest pain appears to be atypical troponins are negative   CTA of chest negative for PE  EKG sinus tachycardia no acute ST or T wave abnormalities  Chest pain may be secondary to anxiety  No further testing at this time      Medications reviewed and confirmed with patient     Tests ordered:  None     OV in 6 weeks     Signed:  Ulises Julian, 3/9/2021, 1:43 PM    An electronic signature was used to authenticate this note.

## 2021-03-09 NOTE — ED PROVIDER NOTES
Triage Chief Complaint:   Palpitations (rapid heart rate last couple of months. has been wearing a holter monitor. sees Dr. Rosendo He. reports HR of 160 prior to EMS arrival. EMS 's, Sinus Tach. c/o sharp pain to L chest. ) and Chest Pain    [place]:  Jose Coates is a 22 y.o. female that presents via EMS for palpitations and chest pain. The patient states that over the past 2 months she has had 3-4 episodes were all of a sudden she starts her heart start racing, left arm will go numb and then her entire body will go numb. States that symptoms last for about 20 minutes before resolving. Denies any triggering events, alleviating or exacerbating factors. She has been seen by cardiology and recently had a normal echocardiogram and is currently on a Holter monitor. Denies any recent medication changes. Denies any drug or alcohol use or excessive caffeine intake. Denies any increased stress at home. States that she came in tonight because she had 2 episodes back to back and had some associated left-sided chest tightness with them. States that symptoms have improved at this time. States that after these events she feels very fatigued. Denies any nausea, vomiting, diarrhea, cough or fever. ROS:  At least 14 systems reviewed and otherwise acutely negative except as in the [address] Past Medical History:   Diagnosis Date    Abnormal EKG     Chest heaviness     Family history of coronary artery disease     Palpitations     Tachycardia     Thyroid disease     hypothyroid     History reviewed. No pertinent surgical history.   Family History   Problem Relation Age of Onset    High Blood Pressure Mother     Heart Disease Maternal Grandmother     High Blood Pressure Maternal Grandmother     Heart Attack Maternal Grandmother     Hypertension Maternal Grandfather     Cancer Maternal Grandfather         LEUKEMIA    Diabetes Maternal Grandfather      Social History     Socioeconomic History    Marital status: Single     Spouse name: Not on file    Number of children: Not on file    Years of education: Not on file    Highest education level: Not on file   Occupational History    Not on file   Social Needs    Financial resource strain: Not on file    Food insecurity     Worry: Not on file     Inability: Not on file    Transportation needs     Medical: Not on file     Non-medical: Not on file   Tobacco Use    Smoking status: Never Smoker    Smokeless tobacco: Never Used   Substance and Sexual Activity    Alcohol use: No    Drug use: No    Sexual activity: Yes   Lifestyle    Physical activity     Days per week: Not on file     Minutes per session: Not on file    Stress: Not on file   Relationships    Social connections     Talks on phone: Not on file     Gets together: Not on file     Attends [employer]ism service: Not on file     Active member of club or organization: Not on file     Attends meetings of clubs or organizations: Not on file     Relationship status: Not on file    Intimate partner violence     Fear of current or ex partner: Not on file     Emotionally abused: Not on file     Physically abused: Not on file     Forced sexual activity: Not on file   Other Topics Concern    Not on file   Social History Narrative    Not on file     No current facility-administered medications for this encounter.       Current Outpatient Medications   Medication Sig Dispense Refill    dilTIAZem (CARDIZEM CD) 120 MG extended release capsule Take 1 capsule by mouth daily (Patient taking differently: Take 240 mg by mouth daily ) 90 capsule 1    dilTIAZem (CARDIZEM CD) 240 MG extended release capsule Take 1 capsule by mouth daily 30 capsule 0    levothyroxine (UNITHROID) 112 MCG tablet Take 1 tablet by mouth daily      ibuprofen (ADVIL;MOTRIN) 800 MG tablet Take 1 tablet by mouth every 8 hours as needed for Pain or Fever 30 tablet 2     No Known Allergies    Nursing Notes Reviewed    Physical Exam:  ED Triage Vitals [03/08/21 2136]   Enc Vitals Group      /83      Pulse 122      Resp 18      Temp 99 °F (37.2 °C)      Temp Source Oral      SpO2 100 %      Weight 118 lb (53.5 kg)      Height 5' 3\" (1.6 m)      Head Circumference       Peak Flow       Pain Score       Pain Loc       Pain Edu? Excl. in [address]? GENERAL APPEARANCE: Awake and alert. Cooperative. No acute distress. HEAD: Normocephalic. Atraumatic. EYES: EOM's grossly intact. Sclera anicteric. ENT: Mucous membranes are moist. Tolerates saliva. No trismus. NECK: Supple. Trachea midline. HEART: Tachycardic. Radial pulses 2+. LUNGS: Respirations unlabored. CTAB  ABDOMEN: Soft. Non-tender. No guarding or rebound. EXTREMITIES: No acute deformities. SKIN: Warm and dry. NEUROLOGICAL: No gross facial drooping. Moves all 4 extremities spontaneously. PSYCHIATRIC: Normal mood.     I have reviewed and interpreted all of the currently available lab results from this visit (if applicable):  Results for orders placed or performed during the hospital encounter of 03/08/21   CBC Auto Differential   Result Value Ref Range    WBC 9.7 4.0 - 10.5 K/CU MM    RBC 4.62 4.2 - 5.4 M/CU MM    Hemoglobin 14.4 12.5 - 16.0 GM/DL    Hematocrit 43.2 37 - 47 %    MCV 93.5 78 - 100 FL    MCH 31.2 (H) 27 - 31 PG    MCHC 33.3 32.0 - 36.0 %    RDW 11.7 11.7 - 14.9 %    Platelets 286 429 - 582 K/CU MM    MPV 13.1 (H) 7.5 - 11.1 FL    Differential Type AUTOMATED DIFFERENTIAL     Segs Relative 47.7 36 - 66 %    Lymphocytes % 43.7 24 - 44 %    Monocytes % 6.2 (H) 0 - 4 %    Eosinophils % 1.4 0 - 3 %    Basophils % 0.7 0 - 1 %    Segs Absolute 4.6 K/CU MM    Lymphocytes Absolute 4.2 K/CU MM    Monocytes Absolute 0.6 K/CU MM    Eosinophils Absolute 0.1 K/CU MM    Basophils Absolute 0.1 K/CU MM    Nucleated RBC % 0.0 %    Total Nucleated RBC 0.0 K/CU MM    Total Immature Neutrophil 0.03 K/CU MM    Immature Neutrophil % 0.3 0 - 0.43 %   Comprehensive Metabolic Panel w/ Reflex to MG Result Value Ref Range    Sodium 138 135 - 145 MMOL/L    Potassium 3.5 3.5 - 5.1 MMOL/L    Chloride 102 99 - 110 mMol/L    CO2 22 21 - 32 MMOL/L    BUN 13 6 - 23 MG/DL    CREATININE 0.7 0.6 - 1.1 MG/DL    Glucose 120 (H) 70 - 99 MG/DL    Calcium 9.4 8.3 - 10.6 MG/DL    Albumin 4.7 3.4 - 5.0 GM/DL    Total Protein 7.4 6.4 - 8.2 GM/DL    Total Bilirubin 0.3 0.0 - 1.0 MG/DL    ALT 11 10 - 40 U/L    AST 18 15 - 37 IU/L    Alkaline Phosphatase 59 40 - 128 IU/L    GFR Non-African American >60 >60 mL/min/1.73m2    GFR African American >60 >60 mL/min/1.73m2    Anion Gap 14 4 - 16   Troponin   Result Value Ref Range    Troponin T <0.010 <0.01 NG/ML   Brain Natriuretic Peptide   Result Value Ref Range    Pro-BNP 28.57 <300 PG/ML   Magnesium   Result Value Ref Range    Magnesium 1.8 1.8 - 2.4 mg/dl   HCG Serum, Quantitative   Result Value Ref Range    hCG Quant <0.5 UIU/ML      Radiographs (if obtained):  [] The following radiograph was interpreted by myself in the absence of a radiologist:  [x] Radiologist's Report Reviewed:    EKG (if obtained): (All EKG's are interpreted by myself in the absence of a cardiologist)  12 lead EKG as interpreted by me reveals sinus tachycardia. Axis is normal. There are no ischemic ST elevations or other suspicious ST changes;  QRS interval is narrow, QT interval is not prolonged. Final interpretation: Sinus tachycardia    MDM:  Plan of care is discussed thoroughly with the patient and family if present. If performed, all imaging and lab work also discussed with patient. All relevant prior results and chart reviewed if available. Patient presents as above. She is having recurrent episodes of palpitations associated with numbness over her entire body. Today this was associated with chest pain. Vital signs are normal except for sinus tachycardia here.   Heart rate was up in the 140s and 160s at home prior to arrival.  Currently being seen by electrophysiology and cardiology without definitive diagnosis. CTA is ordered here to rule out pulmonary embolism. Patient is given IV fluids. Metabolic work-up is unremarkable. Patient is not anemic. Troponin within normal limits. CTA does not show any evidence of acute abnormality. Patient remains tachycardic on reevaluation but otherwise is stable and without any ongoing symptoms. She is appropriate for discharge, close follow-up with PCP and electrophysiologist this week. Patient agreeable with this plan of care and discharged in good condition. Clinical Impression:  1. Chest pain, unspecified type    2.  Palpitations      (Please note that portions of this note may have been completed with a voice recognition program. Efforts were made to edit the dictations but occasionally words are mis-transcribed.)    MD Debbie Miranda MD  03/09/21 0025

## 2021-03-09 NOTE — PATIENT INSTRUCTIONS
**It is YOUR responsibilty to bring medication bottles and/or updated medication list to [address] [place] [address]. This will allow us to better serve you and all your healthcare needs**        Please be informed that if you contact our office outside of normal business hours the physician on call cannot help with any scheduling or rescheduling issues, procedure instruction questions or any type of medication issue. We advise you for any urgent/emergency that you go to the nearest emergency room!     PLEASE CALL OUR OFFICE DURING NORMAL BUSINESS HOURS    Monday - Friday   8 am to 5 pm    [place]: [place] [address]: [phone]    [place]:  [phone]

## 2021-03-12 ENCOUNTER — OFFICE VISIT (OUTPATIENT)
Dept: FAMILY MEDICINE CLINIC | Age: 26
End: 2021-03-12
Payer: COMMERCIAL

## 2021-03-12 VITALS
BODY MASS INDEX: 21.56 KG/M2 | SYSTOLIC BLOOD PRESSURE: 118 MMHG | HEART RATE: 95 BPM | DIASTOLIC BLOOD PRESSURE: 72 MMHG | TEMPERATURE: 98.6 F | WEIGHT: 121.7 LBS | HEIGHT: 63 IN | OXYGEN SATURATION: 99 %

## 2021-03-12 DIAGNOSIS — G43.909 MIGRAINE WITHOUT STATUS MIGRAINOSUS, NOT INTRACTABLE, UNSPECIFIED MIGRAINE TYPE: Primary | ICD-10-CM

## 2021-03-12 DIAGNOSIS — R00.2 PALPITATIONS: ICD-10-CM

## 2021-03-12 DIAGNOSIS — E03.9 HYPOTHYROIDISM, UNSPECIFIED TYPE: ICD-10-CM

## 2021-03-12 LAB
EKG ATRIAL RATE: 113 BPM
EKG DIAGNOSIS: NORMAL
EKG P-R INTERVAL: 116 MS
EKG Q-T INTERVAL: 332 MS
EKG QRS DURATION: 76 MS
EKG QTC CALCULATION (BAZETT): 455 MS
EKG R AXIS: 113 DEGREES
EKG T AXIS: 162 DEGREES
EKG VENTRICULAR RATE: 113 BPM

## 2021-03-12 PROCEDURE — G8420 CALC BMI NORM PARAMETERS: HCPCS | Performed by: PHYSICIAN ASSISTANT

## 2021-03-12 PROCEDURE — G8427 DOCREV CUR MEDS BY ELIG CLIN: HCPCS | Performed by: PHYSICIAN ASSISTANT

## 2021-03-12 PROCEDURE — G8484 FLU IMMUNIZE NO ADMIN: HCPCS | Performed by: PHYSICIAN ASSISTANT

## 2021-03-12 PROCEDURE — 99214 OFFICE O/P EST MOD 30 MIN: CPT | Performed by: PHYSICIAN ASSISTANT

## 2021-03-12 PROCEDURE — 1036F TOBACCO NON-USER: CPT | Performed by: PHYSICIAN ASSISTANT

## 2021-03-12 RX ORDER — LEVOTHYROXINE SODIUM 112 UG/1
112 TABLET ORAL DAILY
Qty: 90 TABLET | Refills: 1 | Status: SHIPPED | OUTPATIENT
Start: 2021-03-12 | End: 2021-09-17

## 2021-03-12 RX ORDER — TOPIRAMATE 25 MG/1
TABLET ORAL
Qty: 70 TABLET | Refills: 0 | Status: SHIPPED | OUTPATIENT
Start: 2021-03-12 | End: 2021-04-02 | Stop reason: SDUPTHER

## 2021-03-12 NOTE — PROGRESS NOTES
3/15/2021    Milo Miramontes    Chief Complaint   Patient presents with    Follow-Up from [facility]     3-8-2021, tachacardia , took pt off Cardizem and put pt on Tiadylt ER     Migraine     pt states daily , was scribed sumatriptan but pt is having headaches daily and can not take this med that often    Other     pt has Holter monitor on, started one week ago       HPI  History obtained from the patient. Barber Leach is a 22 y.o. female who presents today for migraines X 7 months. Migraines - Patient complains of migraines X 7 months which have become increasingly frequent over the last few weeks, and she states that she is currently having them daily. Notes that they last for 2-3 hours, sometimes longer, until she goes to bed. States that they usually occur over her left temple. Denies aura. The patient works as a dental hygienist and states that some of the strong odors at the dental office trigger migraines for her, and bright lights sometimes trigger as well. She states that she stays very well hydrated, does not drink caffeine, and there have been no changes to her diet or sleep routine. She states that she goes to bed around 9:30pm and wakes up at 6:00am. Denies joint pain. She tried Amitriptyline a few months ago, and it worked well at preventing migraines but caused her stomach to hurt, so she had to stop taking it. Palpitations - Patient follows with cardiology and is now seeing an electrophysiologist for evaluation. She has now had three episodes of chest pain and left arm numbness and was just seen in the ED for her third episode on 3/8/21. She states that these episodes occur randomly, not with exertion or any identifiable trigger. She states that when they occur, she feels \"really hot,\" and her left arm feels numb and tingles. She states that these episodes have occurred 3 times, but she frequently gets palpitations and random tachycardia, as well.   When she was seen in the ED, she was switched toTiadylt  mg daily. She is currently wearing a 30 day Holter monitor, which was just placed last week. REVIEW OF SYMPTOMS  Review of Systems   Constitutional: Negative for chills and fever. Respiratory: Negative for cough and shortness of breath. Gastrointestinal: Negative for diarrhea and vomiting.      PAST MEDICAL HISTORY  Past Medical History:   Diagnosis Date    Abnormal EKG     Chest heaviness     Family history of coronary artery disease     Palpitations     Tachycardia     Thyroid disease     hypothyroid       FAMILY HISTORY  Family History   Problem Relation Age of Onset    High Blood Pressure Mother     Heart Disease Maternal Grandmother     High Blood Pressure Maternal Grandmother     Heart Attack Maternal Grandmother     Hypertension Maternal Grandfather     Cancer Maternal Grandfather         LEUKEMIA    Diabetes Maternal Grandfather        SOCIAL HISTORY  Social History     Socioeconomic History    Marital status: Single     Spouse name: Not on file    Number of children: Not on file    Years of education: Not on file    Highest education level: Not on file   Occupational History    Not on file   Social Needs    Financial resource strain: Not on file    Food insecurity     Worry: Not on file     Inability: Not on file    Transportation needs     Medical: Not on file     Non-medical: Not on file   Tobacco Use    Smoking status: Never Smoker    Smokeless tobacco: Never Used   Substance and Sexual Activity    Alcohol use: No    Drug use: No    Sexual activity: Yes   Lifestyle    Physical activity     Days per week: Not on file     Minutes per session: Not on file    Stress: Not on file   Relationships    Social connections     Talks on phone: Not on file     Gets together: Not on file     Attends Sabianism service: Not on file     Active member of club or organization: Not on file     Attends meetings of clubs or organizations: Not on file Relationship status: Not on file    Intimate partner violence     Fear of current or ex partner: Not on file     Emotionally abused: Not on file     Physically abused: Not on file     Forced sexual activity: Not on file   Other Topics Concern    Not on file   Social History Narrative    Not on file        SURGICAL HISTORY  No past surgical history on file. CURRENT MEDICATIONS  Current Outpatient Medications   Medication Sig Dispense Refill    levothyroxine (UNITHROID) 112 MCG tablet Take 1 tablet by mouth daily 90 tablet 1    topiramate (TOPAMAX) 25 MG tablet Take 1 tablet by mouth daily for 7 days, THEN 2 tablets daily for 7 days, THEN 3 tablets daily for 7 days, THEN 4 tablets daily for 7 days. 70 tablet 0    SUMAtriptan (IMITREX) 100 MG tablet Take 100 mg by mouth once as needed for Migraine      dilTIAZem (CARDIZEM CD) 360 MG extended release capsule Take 1 capsule by mouth daily (Patient not taking: Reported on 3/12/2021) 30 capsule 3    ibuprofen (ADVIL;MOTRIN) 800 MG tablet Take 1 tablet by mouth every 8 hours as needed for Pain or Fever (Patient not taking: Reported on 3/12/2021) 30 tablet 2     No current facility-administered medications for this visit. ALLERGIES  No Known Allergies    RECENT LABS    No results found for: LABA1C  No results found for: EAG    Lab Results   Component Value Date    CHOL 167 09/19/2020     No results found for: Haylee Roof [address]    Lab Results   Component Value Date    WBC 9.7 03/08/2021    HGB 14.4 03/08/2021    HCT 43.2 03/08/2021    MCV 93.5 03/08/2021     03/08/2021       PHYSICAL EXAM  /72   Pulse 95   Temp 98.6 °F (37 °C)   Ht 5' 3\" (1.6 m)   Wt 121 lb 11.2 oz (55.2 kg)   LMP 03/01/2021   SpO2 99%   BMI 21.56 kg/m²     Physical Exam  Constitutional:       Appearance: Normal appearance. HENT:      Head: Normocephalic and atraumatic. Eyes:      Comments: EOM grossly intact.    Cardiovascular:      Rate and Rhythm: Normal rate and regular rhythm. Heart sounds: No murmur. No friction rub. No gallop. Pulmonary:      Effort: Pulmonary effort is normal.      Breath sounds: Normal breath sounds. No wheezing, rhonchi or rales. Skin:     General: Skin is warm and dry. Neurological:      Mental Status: She is alert and oriented to person, place, and time. Comments: Cranial nerves II-XII grossly intact   Psychiatric:         Mood and Affect: Mood normal.         Behavior: Behavior normal.         ASSESSMENT & PLAN  1. Migraine without status migrainosus, not intractable, unspecified migraine type  Will check ESR since migraines are in the left temple region to rule out GCA, though I explained that Giant Cell Arteritis is not common in her age range. Started the patient on Topamax 25 mg daily for migraine prevention and instructed the patient to increase in 25 mg increments weekly until she reached 100 mg daily. Informed her of adverse effects including appetite suppression, and I encouraged her to make sure she eats at least one healthy meal per day. Will follow up with the patient in 5 weeks. If her migraines have not improved on Topamax, we may consider a CRGP antagonist such as Aimovig.   - Sedimentation Rate; Future  - topiramate (TOPAMAX) 25 MG tablet; Take 1 tablet by mouth daily for 7 days, THEN 2 tablets daily for 7 days, THEN 3 tablets daily for 7 days, THEN 4 tablets daily for 7 days. Dispense: 70 tablet; Refill: 0    2. Palpitations  Will check 24-hour collection of urine catecholamines and metanephrines to rule out pheochromocytoma. I explained to the patient that this is a rare diagnosis, but it is worth checking for given her episodes of hot flashes and palpitations. If this test and her 30-day Holter monitor are unrevealing, we may consider switching her levothyroxine brand and/or starting an anxiety medication. We will follow-up with the patient in 5 weeks. - CATECHOLAMINES FREE URINE;  Future  - METANEPHRINES URINE; Future    3. Hypothyroidism, unspecified type  Refilled medication. Given the patient's palpitations, if her electrophysiology work-up is unremarkable, we may consider switching her brand of levothyroxine. We will follow-up with the patient in 5 weeks. - levothyroxine (UNITHROID) 112 MCG tablet; Take 1 tablet by mouth daily  Dispense: 90 tablet; Refill: 1        Return in about 5 weeks (around 4/16/2021).             Electronically signed by Pam Crow PA-C on 3/15/2021

## 2021-03-13 ENCOUNTER — HOSPITAL ENCOUNTER (OUTPATIENT)
Age: 26
Discharge: HOME | End: 2021-03-13
Payer: COMMERCIAL

## 2021-03-13 LAB — ERYTHROCYTE SEDIMENTATION RATE: 0 MM/HR (ref 0–20)

## 2021-03-13 PROCEDURE — 36415 COLL VENOUS BLD VENIPUNCTURE: CPT

## 2021-03-13 PROCEDURE — 82384 ASSAY THREE CATECHOLAMINES: CPT

## 2021-03-13 PROCEDURE — 85652 RBC SED RATE AUTOMATED: CPT

## 2021-03-15 RX ORDER — DILTIAZEM HYDROCHLORIDE 240 MG/1
CAPSULE, COATED, EXTENDED RELEASE ORAL
Qty: 30 CAPSULE | Refills: 0 | OUTPATIENT
Start: 2021-03-15

## 2021-03-15 NOTE — RESULT ENCOUNTER NOTE
Please call the patient and let her know that I reviewed the results of her blood work. Her ESR test was negative. We'll let her know what her urine tests show when we get the results.      Thanks,  [place]

## 2021-03-19 ENCOUNTER — HOSPITAL ENCOUNTER (OUTPATIENT)
Age: 26
Setting detail: SPECIMEN
Discharge: HOME | End: 2021-03-19
Payer: COMMERCIAL

## 2021-03-19 PROCEDURE — 83835 ASSAY OF METANEPHRINES: CPT

## 2021-03-19 PROCEDURE — 82384 ASSAY THREE CATECHOLAMINES: CPT

## 2021-03-22 ENCOUNTER — TELEPHONE (OUTPATIENT)
Dept: FAMILY MEDICINE CLINIC | Age: 26
End: 2021-03-22

## 2021-03-22 DIAGNOSIS — G43.909 MIGRAINE WITHOUT STATUS MIGRAINOSUS, NOT INTRACTABLE, UNSPECIFIED MIGRAINE TYPE: Primary | ICD-10-CM

## 2021-03-22 NOTE — TELEPHONE ENCOUNTER
Please call the patient and let her know that yes, that can be a side effect of the Topamax. So let's go ahead and have her stop the Topamax and one of the newer medications for migraine prevention, a once monthly injection (she can do these at home -- they come in an easy use pen). I'll send the prescription, and we'll start working on the prior auth. Please get her scheduled for a nursing visit (ideally sometime this week) to give her a sample injection and teach her how to use the pens at home.     Thanks,  [place]

## 2021-03-22 NOTE — TELEPHONE ENCOUNTER
Called pt  , pt states has had tingling , numbness in hands and feet for 1 wk , pt reports is still on heart monitor until 4-02-21 , has not had any heart related sx as before since she was put on new med but is now wondering if new sx could be caused by new med .   Please advise

## 2021-03-22 NOTE — TELEPHONE ENCOUNTER
PT HAS SOME NEW SX THAT HAVE BEEN GOING ON FOR 1 WEEK-TINGLING IN FEET,TOES, HANDS,AND FINGERS.  PLEASE ADVISE

## 2021-03-25 LAB
CATECHOLAMINES FRACT 24 HOUR URINE: NORMAL
CREATININE URINE: 58 UG/G CRT (ref 0–300)
CREATININE URINE: 67 MG/DL
CREATININE URINE: 67 MG/DL
CREATININE, 24H UR: 1072 MG/D (ref 700–1600)
CREATININE, 24H UR: 1072 MG/D (ref 700–1600)
DOPAMINE (G CRT): 157 UG/G CRT (ref 0–250)
DOPAMINE 24 HOUR URINE: 168 UG/D (ref 71–485)
DOPAMINE, URINE: 105 UG/L
EPINEPHRINE (G CRT): 6 UG/G CRT (ref 0–20)
EPINEPHRINE 24 HOUR URINE: 6 UG/D (ref 1–14)
EPINEPHRINE, URINE: 4 UG/L
METANEPHRINE UF INTERPRETATION: NORMAL
METANEPHRINES URINE: 62 UG/D (ref 36–229)
METANEPHRINES, NMOL/L: 39 UG/L
NOREPINEPH (G CRT): 36 UG/G CRT (ref 0–45)
NOREPINEPHRINE 24 HOUR URINE: 38 UG/D (ref 14–120)
NOREPINEPHRINE, URINE: 24 UG/L
NORMETANEPHRINE 24 HOUR URINE: 184 UG/D (ref 95–650)
NORMETANEPHRINE URINE: 172 UG/G CRT (ref 0–400)
NORMETANEPHRINES, NMOL/L: 115 UG/L
TIME URINE: 24
TIME URINE: 24
VOLUME, (UVOL): 1600
VOLUME, (UVOL): 1600

## 2021-03-26 NOTE — RESULT ENCOUNTER NOTE
Please call the patient and let her know that I reviewed the results of her urine tests. They were negative. She has a nursing visit today at 3:00pm for her loading dose of Emgality for migraine prevention.      Thanks,  [facility]

## 2021-04-02 DIAGNOSIS — G43.909 MIGRAINE WITHOUT STATUS MIGRAINOSUS, NOT INTRACTABLE, UNSPECIFIED MIGRAINE TYPE: ICD-10-CM

## 2021-04-02 RX ORDER — TOPIRAMATE 25 MG/1
25 TABLET ORAL 2 TIMES DAILY
Qty: 120 TABLET | Refills: 2 | Status: SHIPPED | OUTPATIENT
Start: 2021-04-02 | End: 2021-04-20

## 2021-04-08 ENCOUNTER — TELEPHONE (OUTPATIENT)
Dept: CARDIOLOGY CLINIC | Age: 26
End: 2021-04-08

## 2021-04-13 PROCEDURE — 93272 ECG/REVIEW INTERPRET ONLY: CPT | Performed by: INTERNAL MEDICINE

## 2021-04-20 ENCOUNTER — OFFICE VISIT (OUTPATIENT)
Dept: CARDIOLOGY CLINIC | Age: 26
End: 2021-04-20
Payer: COMMERCIAL

## 2021-04-20 VITALS
SYSTOLIC BLOOD PRESSURE: 110 MMHG | WEIGHT: 123.6 LBS | DIASTOLIC BLOOD PRESSURE: 78 MMHG | BODY MASS INDEX: 21.9 KG/M2 | HEIGHT: 63 IN | HEART RATE: 84 BPM

## 2021-04-20 DIAGNOSIS — R00.0 TACHYCARDIA: Primary | ICD-10-CM

## 2021-04-20 PROCEDURE — 99213 OFFICE O/P EST LOW 20 MIN: CPT | Performed by: NURSE PRACTITIONER

## 2021-04-20 PROCEDURE — 1036F TOBACCO NON-USER: CPT | Performed by: NURSE PRACTITIONER

## 2021-04-20 PROCEDURE — G8427 DOCREV CUR MEDS BY ELIG CLIN: HCPCS | Performed by: NURSE PRACTITIONER

## 2021-04-20 PROCEDURE — G8420 CALC BMI NORM PARAMETERS: HCPCS | Performed by: NURSE PRACTITIONER

## 2021-04-20 ASSESSMENT — ENCOUNTER SYMPTOMS
ORTHOPNEA: 0
SHORTNESS OF BREATH: 0

## 2021-04-20 NOTE — PATIENT INSTRUCTIONS
**It is YOUR responsibilty to bring medication bottles and/or updated medication list to [address] [place] [address]. This will allow us to better serve you and all your healthcare needs**        Please be informed that if you contact our office outside of normal business hours the physician on call cannot help with any scheduling or rescheduling issues, procedure instruction questions or any type of medication issue. We advise you for any urgent/emergency that you go to the nearest emergency room!     PLEASE CALL OUR OFFICE DURING NORMAL BUSINESS HOURS    Monday - Friday   8 am to 5 pm    [place]: [phone]    [place]:  [phone]

## 2021-04-20 NOTE — LETTER
Shagufta Miramontes  1995  [id number]    Have you had any Chest Pain that is not new? - No     Have you had any Shortness of Breath - No  If Yes - When on exertion    Have you had any dizziness - No     Have you had any palpitations that are not new? - Yes, less often now  If Yes DO EKG - Do you feel your heart racing  How long does it last - .3  seconds     Do you have any edema - swelling in No    If Yes - CHECK TO SEE IF THE EDEMA IS PITTING      When did you have your last labs drawn 3/8/2021 jn epic        If we do not have these labs you are retrieve these labs for these providers!     Do you have a surgery or procedure scheduled in the near future - No  If Yes- DO EKG       Ask patient if they want to sign up for MyChart if they are not already signed up     Check to see if we have an E-MAIL on file for the patient     Check medication list thoroughly!!! AND RECONCILE OUTSIDE MEDICATIONS  If dose has changed change the entire order not just the MG  BE SURE TO ASK PATIENT IF THEY NEED MEDICATION REFILLS     At check out add to every patient's \"wrap up\" the following dot phrase AFTERHOURSEDUCATION and ensure we explain this to our patients

## 2021-04-20 NOTE — PROGRESS NOTES
4/20/2021  Primary cardiologist: Dr. Cali Rapp  is an established 22 y.o.  female here for follow-up on tachycardia       SUBJECTIVE/OBJECTIVE:  HPI :    She is feeling well today. Reports her palpitations have decreased since increasing of Cardizem. She was seen on 03/02/2021 and placed on a 30 day event monitor. She becomes very anxious with cardio. She notes left arm pain and left arm numbness when she becomes tachycardic. She notes chest pain is a sharp anterior chest wall pain lasting 6 seconds. She notes off and on throughout the day. She also notes shortness of breath with exertion. She was then seen in the emergency room with palpitations. States at home heart rate was up to 160. In emergency department noted to be in sinus tach rate of 140. Was given IV fluids heart rate came down to low 100s. Review of Systems   Constitution: Negative for diaphoresis and malaise/fatigue. Cardiovascular: Negative for chest pain, claudication, dyspnea on exertion, irregular heartbeat, leg swelling, near-syncope, orthopnea, palpitations and paroxysmal nocturnal dyspnea. Respiratory: Negative for shortness of breath. Neurological: Negative for dizziness and light-headedness. There were no vitals filed for this visit. There were no vitals taken for this visit. No flowsheet data found. Wt Readings from Last 3 Encounters:   03/12/21 121 lb 11.2 oz (55.2 kg)   03/09/21 119 lb (54 kg)   03/08/21 118 lb (53.5 kg)     There is no height or weight on file to calculate BMI. Physical Exam :     Neck: supple,  no carotid bruit appreciated, JVD not appreciated    Respiratory:  Normal breath sounds, No respiratory distress, No wheezing    Cardiovascular:  S1 S2 appreciated, Regular rate, regular rhythm,  no murmur appreciated, No rubs appreciated, No gallops appreciated, No chest tenderness.      Extremities:  no edema to the lower extremities    All pertinent data reviewed and discussed with patient including:    Cardiac event monitor April 13, 2021  PREDOMINANT RHYTHM IS SR  NO SIG ECTOPY NOTED     ASSESSMENT/PLAN:    Inappropriate tachycardia   Cardiac event monitor completed showing predominant rhythm sinus rhythm she had few episodes of tachycardia however no significant ectopy or arrhythmia were detected. Recommend to continue with diltiazem 360 mg once daily. Continue to avoid stimulants. Medications reviewed and confirmed with patient     Tests ordered:  None     OV in 6 weeks     Signed:  Beverly Chavez, 4/20/2021, 3:24 PM    An electronic signature was used to authenticate this note.

## 2021-04-21 ENCOUNTER — OFFICE VISIT (OUTPATIENT)
Dept: CARDIOLOGY CLINIC | Age: 26
End: 2021-04-21
Payer: COMMERCIAL

## 2021-04-21 ENCOUNTER — TELEPHONE (OUTPATIENT)
Dept: CARDIOLOGY CLINIC | Age: 26
End: 2021-04-21

## 2021-04-21 VITALS
HEART RATE: 102 BPM | DIASTOLIC BLOOD PRESSURE: 84 MMHG | SYSTOLIC BLOOD PRESSURE: 122 MMHG | HEIGHT: 63 IN | BODY MASS INDEX: 21.58 KG/M2 | WEIGHT: 121.8 LBS

## 2021-04-21 DIAGNOSIS — R00.2 PALPITATIONS: ICD-10-CM

## 2021-04-21 PROCEDURE — 93000 ELECTROCARDIOGRAM COMPLETE: CPT | Performed by: INTERNAL MEDICINE

## 2021-04-21 PROCEDURE — 1036F TOBACCO NON-USER: CPT | Performed by: INTERNAL MEDICINE

## 2021-04-21 PROCEDURE — G8428 CUR MEDS NOT DOCUMENT: HCPCS | Performed by: INTERNAL MEDICINE

## 2021-04-21 PROCEDURE — 99213 OFFICE O/P EST LOW 20 MIN: CPT | Performed by: INTERNAL MEDICINE

## 2021-04-21 PROCEDURE — G8420 CALC BMI NORM PARAMETERS: HCPCS | Performed by: INTERNAL MEDICINE

## 2021-04-21 NOTE — TELEPHONE ENCOUNTER
Patient calling from work, symptom were last night. Currently .  Discussed with Antonia Sierra scheduled with Dr Ethel Montes

## 2021-04-21 NOTE — TELEPHONE ENCOUNTER
Patient called stating that she saw Carmela Cerna yesterday and that she was advised that her heart would need some time to adjust to the medication but when she woke up this morning her heart rate was 185 and she is having chest pain. Please call her back at ph# [phone].

## 2021-04-21 NOTE — PROGRESS NOTES
Jains crespo  is a  Established patient  ,22 y.o.   female here for evaluation of the following chief complaint(s):    tachycardia        SUBJECTIVE/OBJECTIVE:  HPI : h/o  tachycardia now here  Feels heat racing, has been having CP as well. Review of Systems    palpiattion    Vitals:    04/21/21 1556   BP: 122/84   Site: Left Upper Arm   Position: Sitting   Cuff Size: Medium Adult   Pulse: 102   Weight: 121 lb 12.8 oz (55.2 kg)   Height: 5' 3\" (1.6 m)     /84 (Site: Left Upper Arm, Position: Sitting, Cuff Size: Medium Adult)   Pulse 102   Ht 5' 3\" (1.6 m)   Wt 121 lb 12.8 oz (55.2 kg)   BMI 21.58 kg/m²   No flowsheet data found. Wt Readings from Last 3 Encounters:   04/21/21 121 lb 12.8 oz (55.2 kg)   04/20/21 123 lb 9.6 oz (56.1 kg)   03/12/21 121 lb 11.2 oz (55.2 kg)     Body mass index is 21.58 kg/m². Physical Exam     Neck: JVD      Lungs : clear    Cardio : Si and S2 audilble      Ext: edema      All pertinent data reviewed      Meds : reviewed         Tests ordered        ASSESSMENT/PLAN:    - tachycardia:  On CCB    - palpitations; see Dr Quentin Haider    inapp sinus tach  An electronic signature was used to authenticate this note.     --Lola Garcia MD

## 2021-05-12 ENCOUNTER — TELEPHONE (OUTPATIENT)
Dept: FAMILY MEDICINE CLINIC | Age: 26
End: 2021-05-12

## 2021-05-19 ENCOUNTER — INITIAL CONSULT (OUTPATIENT)
Dept: CARDIOLOGY CLINIC | Age: 26
End: 2021-05-19
Payer: COMMERCIAL

## 2021-05-19 ENCOUNTER — OFFICE VISIT (OUTPATIENT)
Dept: FAMILY MEDICINE CLINIC | Age: 26
End: 2021-05-19
Payer: COMMERCIAL

## 2021-05-19 VITALS
DIASTOLIC BLOOD PRESSURE: 64 MMHG | WEIGHT: 121.8 LBS | HEART RATE: 86 BPM | HEIGHT: 63 IN | SYSTOLIC BLOOD PRESSURE: 110 MMHG | BODY MASS INDEX: 21.58 KG/M2

## 2021-05-19 VITALS
WEIGHT: 115.7 LBS | OXYGEN SATURATION: 97 % | DIASTOLIC BLOOD PRESSURE: 60 MMHG | HEART RATE: 82 BPM | BODY MASS INDEX: 20.5 KG/M2 | SYSTOLIC BLOOD PRESSURE: 118 MMHG | HEIGHT: 63 IN

## 2021-05-19 DIAGNOSIS — R00.2 PALPITATIONS: ICD-10-CM

## 2021-05-19 DIAGNOSIS — Z12.4 CERVICAL CANCER SCREENING: ICD-10-CM

## 2021-05-19 DIAGNOSIS — R00.2 PALPITATIONS: Primary | ICD-10-CM

## 2021-05-19 DIAGNOSIS — G43.909 MIGRAINE WITHOUT STATUS MIGRAINOSUS, NOT INTRACTABLE, UNSPECIFIED MIGRAINE TYPE: Primary | ICD-10-CM

## 2021-05-19 PROCEDURE — G8427 DOCREV CUR MEDS BY ELIG CLIN: HCPCS | Performed by: PHYSICIAN ASSISTANT

## 2021-05-19 PROCEDURE — 99243 OFF/OP CNSLTJ NEW/EST LOW 30: CPT | Performed by: INTERNAL MEDICINE

## 2021-05-19 PROCEDURE — G8427 DOCREV CUR MEDS BY ELIG CLIN: HCPCS | Performed by: INTERNAL MEDICINE

## 2021-05-19 PROCEDURE — 93000 ELECTROCARDIOGRAM COMPLETE: CPT | Performed by: NURSE PRACTITIONER

## 2021-05-19 PROCEDURE — G8420 CALC BMI NORM PARAMETERS: HCPCS | Performed by: INTERNAL MEDICINE

## 2021-05-19 PROCEDURE — G8420 CALC BMI NORM PARAMETERS: HCPCS | Performed by: PHYSICIAN ASSISTANT

## 2021-05-19 PROCEDURE — 1036F TOBACCO NON-USER: CPT | Performed by: PHYSICIAN ASSISTANT

## 2021-05-19 PROCEDURE — 99214 OFFICE O/P EST MOD 30 MIN: CPT | Performed by: PHYSICIAN ASSISTANT

## 2021-05-19 RX ORDER — CITALOPRAM 10 MG/1
10 TABLET ORAL DAILY
Qty: 30 TABLET | Refills: 2 | Status: SHIPPED | OUTPATIENT
Start: 2021-05-19 | End: 2021-09-23

## 2021-05-19 NOTE — PROGRESS NOTES
Electrophysiology Consult Note      Reason for consultation:  Palpitations, tachycardia    Chief complaint :  Palpitations    Referring physician: Josephus Meigs      Primary care physician: Lola Hoyt MD      History of Present Illness:     Patient is a 42-year-old female referred by Dr. Mares For palpitations and tachycardia. Patient reports this all started in January. Patient reports she felt her heart was racing she went to [address] [place] [address] and she noted that she was in sinus tachycardia. Patient reports her heart rate went up to high as 180 and she reports that she was not doing much at all and her heart was racing. She was told she had sinus tachycardia and Cardizem was started and then was referred to Dr. Zain Juares and Josephus Meigs did the initial work-up and increased the Cardizem    Patient reports she has not had any episodes recently but she had bad episodes when she had to go to the hospital couple of times included the one in january    Patient denies any chest pain, shortness of breath, dizziness or syncope    Pastmedical history:   Past Medical History:   Diagnosis Date    Abnormal EKG     Chest heaviness     Family history of coronary artery disease     Palpitations     Tachycardia     Thyroid disease     hypothyroid       Surgical history : No recent surgeries    Family history:   Family History   Problem Relation Age of Onset    High Blood Pressure Mother     Heart Disease Maternal Grandmother     High Blood Pressure Maternal Grandmother     Heart Attack Maternal Grandmother     Hypertension Maternal Grandfather     Cancer Maternal Grandfather         LEUKEMIA    Diabetes Maternal Grandfather        Social history :  reports that she has never smoked. She has never used smokeless tobacco. She reports that she does not drink alcohol and does not use drugs.     No Known Allergies    Current Outpatient Medications on File Prior to Visit   Medication Sig Dispense Refill    Galcanezumab-gnlm 120 MG/ML SOAJ Inject 120 mg into the skin every 30 days 4 pen 2    levothyroxine (UNITHROID) 112 MCG tablet Take 1 tablet by mouth daily 90 tablet 1    dilTIAZem (CARDIZEM CD) 360 MG extended release capsule Take 1 capsule by mouth daily 30 capsule 3    ibuprofen (ADVIL;MOTRIN) 800 MG tablet Take 1 tablet by mouth every 8 hours as needed for Pain or Fever 30 tablet 2     No current facility-administered medications on file prior to visit. Review of Systems:   Review of Systems   Constitutional: Negative for activity change, chills, fatigue and fever. HENT: Negative for congestion, ear pain and tinnitus. Eyes: Negative for photophobia, pain and visual disturbance. Respiratory: Negative for cough, chest tightness, shortness of breath and wheezing. Cardiovascular: Negative for chest pain, palpitations and leg swelling. Gastrointestinal: Negative for abdominal pain, blood in stool, constipation, diarrhea, nausea and vomiting. Endocrine: Negative for cold intolerance and heat intolerance. Genitourinary: Negative for dysuria, flank pain and hematuria. Musculoskeletal: Negative for arthralgias, back pain, myalgias and neck stiffness. Skin: Negative for color change and rash. Allergic/Immunologic: Negative for food allergies. Neurological: Negative for dizziness, light-headedness, numbness and headaches. Hematological: Does not bruise/bleed easily. Psychiatric/Behavioral: Negative for agitation, behavioral problems and confusion. Examination:      Vitals:    05/19/21 1559   BP: 110/64   Pulse: 86   Weight: 121 lb 12.8 oz (55.2 kg)   Height: 5' 3\" (1.6 m)        Body mass index is 21.58 kg/m². Physical Exam  HENT:      Head: Normocephalic and atraumatic. Eyes:      General:         Right eye: No discharge. Conjunctiva/sclera: Conjunctivae normal.      Pupils: Pupils are equal, round, and reactive to light.    Neck:      Thyroid:

## 2021-05-19 NOTE — PATIENT INSTRUCTIONS
Calcitonin gene-related peptide (CGRP) Antagonists  erenumab (Aimovig)  fremanezumab (Ajovy)  galcanezumab (Emgality) *  eptinezumab (Vyepti)    [address] [facility]  [address] [place] [address] at [place] [address] [phone] [facility] Kaden Barajas  Phone: [phone]    Call [address] [place] [address] Department: [phone] to schedule COVID vaccine.

## 2021-05-19 NOTE — PROGRESS NOTES
5/19/2021    Milo Miramontes    Chief Complaint   Patient presents with    Migraine     pt states has not had any migraines at all since starting Emgality injections       HPI  History obtained from the patient. Ladan Huang is a 22 y.o. female who presents today for follow-up on migraines. Migraines - The patient did the first loading dose of Emgality and just completed her second monthly injection a few days ago. She will be due for her next injection June 26, and this will be the final sample injection she has (we gave her a 90-day supply). She states that, \"I have not had 1 migraine. It is so amazing. I do not think I even had a slight headache. \"  Denies side effects. The patient's prior authorization and appeal for insurance coverage of Emgality were both denied. The patient notes that starting this week, she gets 1 day a week off (Wednesday). Palpitations - The patient states that her electrophysiologist, Dr. Elvin Mcgarry, has not been able to give her a definitive diagnosis for her palpitations and tachycardia. She notes that she is continued to have episodes of tachycardia with heart rates in the 180s, but she has not had any more episodes of chest pain. She states that Dr. Elvin Mcgarry is considering an EP study with a catheter. She has a follow-up appointment with him coming up. Health Maintenance - Patient's care gaps include cervical cancer screen, Covid vaccine. The patient states that she has an appointment next Wednesday with her gynecologist to get a Pap smear. I asked that she fax a copy of the results to our office. Discussed the Covid vaccine, and the patient is agreeable to getting this. I recommended she call [facility] and schedule appointment to get her vaccine. Recommend to dose series such as Pfizer amiodarone if possible. REVIEW OF SYMPTOMS  Review of Systems   Constitutional: Negative for chills and fever.    Respiratory: Negative for cough and shortness of breath. Gastrointestinal: Negative for diarrhea and vomiting. PAST MEDICAL HISTORY  Past Medical History:   Diagnosis Date    Abnormal EKG     Chest heaviness     Family history of coronary artery disease     Palpitations     Tachycardia     Thyroid disease     hypothyroid       FAMILY HISTORY  Family History   Problem Relation Age of Onset    High Blood Pressure Mother     Heart Disease Maternal Grandmother     High Blood Pressure Maternal Grandmother     Heart Attack Maternal Grandmother     Hypertension Maternal Grandfather     Cancer Maternal Grandfather         LEUKEMIA    Diabetes Maternal Grandfather        SOCIAL HISTORY  Social History     Socioeconomic History    Marital status: Single     Spouse name: Not on file    Number of children: Not on file    Years of education: Not on file    Highest education level: Not on file   Occupational History    Not on file   Tobacco Use    Smoking status: Never Smoker    Smokeless tobacco: Never Used   Substance and Sexual Activity    Alcohol use: No    Drug use: No    Sexual activity: Yes   Other Topics Concern    Not on file   Social History Narrative    Not on file     Social Determinants of Health     Financial Resource Strain:     Difficulty of Paying Living Expenses:    Food Insecurity:     Worried About Running Out of Food in the Last Year:     Ran Out of Food in the Last Year:    Transportation Needs:     Lack of Transportation (Medical):      Lack of Transportation (Non-Medical):    Physical Activity:     Days of Exercise per Week:     Minutes of Exercise per Session:    Stress:     Feeling of Stress :    Social Connections:     Frequency of Communication with Friends and Family:     Frequency of Social Gatherings with Friends and Family:     Attends [employer] Services:     Active Member of Clubs or Organizations:     Attends Club or Organization Meetings:     Marital Status:    Intimate Partner Violence:     Fear of Current or Ex-Partner:     Emotionally Abused:     Physically Abused:     Sexually Abused:         SURGICAL HISTORY  No past surgical history on file. CURRENT MEDICATIONS  Current Outpatient Medications   Medication Sig Dispense Refill    citalopram (CELEXA) 10 MG tablet Take 1 tablet by mouth daily 30 tablet 2    Galcanezumab-gnlm 120 MG/ML SOAJ Inject 120 mg into the skin every 30 days 4 pen 2    levothyroxine (UNITHROID) 112 MCG tablet Take 1 tablet by mouth daily 90 tablet 1    dilTIAZem (CARDIZEM CD) 360 MG extended release capsule Take 1 capsule by mouth daily 30 capsule 3    ibuprofen (ADVIL;MOTRIN) 800 MG tablet Take 1 tablet by mouth every 8 hours as needed for Pain or Fever 30 tablet 2     No current facility-administered medications for this visit. ALLERGIES  No Known Allergies    RECENT LABS    No results found for: LABA1C  No results found for: EAG    Lab Results   Component Value Date    CHOL 167 09/19/2020     No results found for: Jacqueline Mohamud [address]    Lab Results   Component Value Date    WBC 9.7 03/08/2021    HGB 14.4 03/08/2021    HCT 43.2 03/08/2021    MCV 93.5 03/08/2021     03/08/2021       PHYSICAL EXAM  /60   Pulse 82   Ht 5' 3\" (1.6 m)   Wt 115 lb 11.2 oz (52.5 kg)   SpO2 97%   BMI 20.50 kg/m²     Physical Exam  Constitutional:       Appearance: Normal appearance. HENT:      Head: Normocephalic and atraumatic. Eyes:      Comments: EOM grossly intact. Cardiovascular:      Rate and Rhythm: Normal rate and regular rhythm. Heart sounds: No murmur heard. No friction rub. No gallop. Pulmonary:      Effort: Pulmonary effort is normal.      Breath sounds: Normal breath sounds. No wheezing, rhonchi or rales. Skin:     General: Skin is warm and dry. Neurological:      Mental Status: She is alert and oriented to person, place, and time.       Comments: Cranial nerves II-XII grossly intact   Psychiatric:         Mood and Affect: Mood normal. Behavior: Behavior normal.         ASSESSMENT & PLAN  1. Migraine without status migrainosus, not intractable, unspecified migraine type  Patient has tried and failed multiple migraine medications: Migraines were too frequent to use Imitrex (she was having migraines every other day to daily), Topamax was stopped due to side effects of paresthesias, amitriptyline was stopped due to nausea and abdominal pain. Patient doing very well on Emgality samples, but her insurance has denied PA and appeal for this medication. Instructed the patient to call her insurance and see if they would cover an alternative such as Raoul Redwood. Referred the patient to neurology. - Amb External Referral To Neurology    2. Palpitations  Patient follows with an electrophysiologist.  Extensive work-up over the last few months has been unremarkable. Patient would like to try a medication for anxiety to see if this helps with tachycardia before going forward with the invasive EP procedure. Prescribed Celexa 10 mg daily for the patient to try. We will follow-up with her in about 5 weeks to see how she is doing on this. - citalopram (CELEXA) 10 MG tablet; Take 1 tablet by mouth daily  Dispense: 30 tablet; Refill: 2    3. Cervical cancer screening  Patient has an appointment with her gynecologist for Pap smear next week. I wrote down our office fax number for her and requested that the results be sent to us, as well. Return in about 5 weeks (around 6/23/2021).             Electronically signed by Aj Evans PA-C on 5/19/2021

## 2021-06-01 RX ORDER — DILTIAZEM HYDROCHLORIDE 360 MG/1
360 CAPSULE, EXTENDED RELEASE ORAL DAILY
Qty: 90 CAPSULE | Refills: 3 | Status: SHIPPED | OUTPATIENT
Start: 2021-06-01 | End: 2022-03-31

## 2021-06-10 ASSESSMENT — ENCOUNTER SYMPTOMS
NAUSEA: 0
ABDOMINAL PAIN: 0
VOMITING: 0
CHEST TIGHTNESS: 0
SHORTNESS OF BREATH: 0
COUGH: 0
BACK PAIN: 0
CONSTIPATION: 0
WHEEZING: 0
BLOOD IN STOOL: 0
PHOTOPHOBIA: 0
DIARRHEA: 0
COLOR CHANGE: 0
EYE PAIN: 0

## 2021-06-23 ENCOUNTER — TELEPHONE (OUTPATIENT)
Dept: FAMILY MEDICINE CLINIC | Age: 26
End: 2021-06-23

## 2021-09-17 DIAGNOSIS — E03.9 HYPOTHYROIDISM, UNSPECIFIED TYPE: ICD-10-CM

## 2021-09-17 RX ORDER — LEVOTHYROXINE SODIUM 112 UG/1
TABLET ORAL
Qty: 90 TABLET | Refills: 1 | Status: SHIPPED | OUTPATIENT
Start: 2021-09-17

## 2021-09-23 ENCOUNTER — OFFICE VISIT (OUTPATIENT)
Dept: CARDIOLOGY CLINIC | Age: 26
End: 2021-09-23
Payer: COMMERCIAL

## 2021-09-23 ENCOUNTER — TELEPHONE (OUTPATIENT)
Dept: CARDIOLOGY CLINIC | Age: 26
End: 2021-09-23

## 2021-09-23 VITALS
HEIGHT: 63 IN | HEART RATE: 79 BPM | SYSTOLIC BLOOD PRESSURE: 118 MMHG | BODY MASS INDEX: 21.83 KG/M2 | DIASTOLIC BLOOD PRESSURE: 80 MMHG | WEIGHT: 123.2 LBS

## 2021-09-23 DIAGNOSIS — R00.2 PALPITATIONS: Primary | ICD-10-CM

## 2021-09-23 DIAGNOSIS — R07.2 PRECORDIAL CHEST PAIN: ICD-10-CM

## 2021-09-23 PROCEDURE — 1036F TOBACCO NON-USER: CPT | Performed by: INTERNAL MEDICINE

## 2021-09-23 PROCEDURE — 93000 ELECTROCARDIOGRAM COMPLETE: CPT | Performed by: INTERNAL MEDICINE

## 2021-09-23 PROCEDURE — G8427 DOCREV CUR MEDS BY ELIG CLIN: HCPCS | Performed by: INTERNAL MEDICINE

## 2021-09-23 PROCEDURE — 99214 OFFICE O/P EST MOD 30 MIN: CPT | Performed by: INTERNAL MEDICINE

## 2021-09-23 PROCEDURE — G8420 CALC BMI NORM PARAMETERS: HCPCS | Performed by: INTERNAL MEDICINE

## 2021-09-23 NOTE — PROGRESS NOTES
Júnior Ivan  is a  Established patient  ,22 y.o.   female here for evaluation of the following chief complaint(s):    Tachycardia, CP        SUBJECTIVE/OBJECTIVE:  HPI : h/o  tachycardia now here  Feels heat racing, has been having CP as well. Review of Systems    Palpiattion, CP    Vitals:    09/23/21 1518   BP: 118/80   Pulse: 79   Weight: 123 lb 3.2 oz (55.9 kg)   Height: 5' 3\" (1.6 m)     /80   Pulse 79   Ht 5' 3\" (1.6 m)   Wt 123 lb 3.2 oz (55.9 kg)   BMI 21.82 kg/m²   No flowsheet data found. Wt Readings from Last 3 Encounters:   09/23/21 123 lb 3.2 oz (55.9 kg)   05/19/21 121 lb 12.8 oz (55.2 kg)   05/19/21 115 lb 11.2 oz (52.5 kg)     Body mass index is 21.82 kg/m². Physical Exam     Neck: JVD      Lungs : clear    Cardio : Si and S2 audilble      Ext: edema      All pertinent data reviewed      Meds : reviewed         Tests ordered        ASSESSMENT/PLAN:    - CTA chest for CAD    - tachycardia:  On CCB    - palpitations; saw EP    inapp sinus tach  An electronic signature was used to authenticate this note.     --Sherry Brunson MD

## 2021-09-30 DIAGNOSIS — G43.909 MIGRAINE WITHOUT STATUS MIGRAINOSUS, NOT INTRACTABLE, UNSPECIFIED MIGRAINE TYPE: ICD-10-CM

## 2021-10-01 RX ORDER — TOPIRAMATE 25 MG/1
25 TABLET ORAL 2 TIMES DAILY
Qty: 60 TABLET | Refills: 2 | Status: SHIPPED | OUTPATIENT
Start: 2021-10-01 | End: 2022-08-17

## 2021-10-01 RX ORDER — TOPIRAMATE 25 MG/1
TABLET ORAL
Qty: 70 TABLET | Refills: 0 | OUTPATIENT
Start: 2021-10-01 | End: 2021-10-29

## 2021-10-20 ENCOUNTER — HOSPITAL ENCOUNTER (OUTPATIENT)
Age: 26
Setting detail: SPECIMEN
Discharge: HOME | End: 2021-10-20
Payer: COMMERCIAL

## 2021-10-20 ENCOUNTER — OFFICE VISIT (OUTPATIENT)
Dept: OBGYN | Age: 26
End: 2021-10-20
Payer: COMMERCIAL

## 2021-10-20 VITALS
DIASTOLIC BLOOD PRESSURE: 78 MMHG | WEIGHT: 123 LBS | HEART RATE: 88 BPM | BODY MASS INDEX: 21.79 KG/M2 | SYSTOLIC BLOOD PRESSURE: 117 MMHG | HEIGHT: 63 IN

## 2021-10-20 DIAGNOSIS — N94.6 DYSMENORRHEA: ICD-10-CM

## 2021-10-20 DIAGNOSIS — Z01.419 WOMEN'S ANNUAL ROUTINE GYNECOLOGICAL EXAMINATION: Primary | ICD-10-CM

## 2021-10-20 PROCEDURE — 87624 HPV HI-RISK TYP POOLED RSLT: CPT

## 2021-10-20 PROCEDURE — 99385 PREV VISIT NEW AGE 18-39: CPT | Performed by: NURSE PRACTITIONER

## 2021-10-20 PROCEDURE — 87801 DETECT AGNT MULT DNA AMPLI: CPT

## 2021-10-20 PROCEDURE — 88142 CYTOPATH C/V THIN LAYER: CPT

## 2021-10-20 SDOH — ECONOMIC STABILITY: FOOD INSECURITY: WITHIN THE PAST 12 MONTHS, YOU WORRIED THAT YOUR FOOD WOULD RUN OUT BEFORE YOU GOT MONEY TO BUY MORE.: NEVER TRUE

## 2021-10-20 SDOH — ECONOMIC STABILITY: FOOD INSECURITY: WITHIN THE PAST 12 MONTHS, THE FOOD YOU BOUGHT JUST DIDN'T LAST AND YOU DIDN'T HAVE MONEY TO GET MORE.: NEVER TRUE

## 2021-10-20 ASSESSMENT — ENCOUNTER SYMPTOMS
RESPIRATORY NEGATIVE: 1
GASTROINTESTINAL NEGATIVE: 1

## 2021-10-20 ASSESSMENT — SOCIAL DETERMINANTS OF HEALTH (SDOH): HOW HARD IS IT FOR YOU TO PAY FOR THE VERY BASICS LIKE FOOD, HOUSING, MEDICAL CARE, AND HEATING?: NOT VERY HARD

## 2021-10-20 NOTE — PROGRESS NOTES
10/20/21    Milo Miramontes  1995    Chief Complaint   Patient presents with    Gynecologic Exam     no c/o. last pap unsure. pt wishes to discuss starting b.c. The patient is a 22 y.o. female, [id number] who presents for her annual exam.  She is menstruating normally. She is  sexually active. She is not currently taking birth control    She reports additional symptoms of dysmenorrhea. Pap smear history: Her last PAP smear was in ? [facility] Her results were normal.    Past Medical History:   Diagnosis Date    Abnormal EKG     Chest heaviness     Family history of coronary artery disease     Palpitations     Tachycardia     Thyroid disease     hypothyroid       History reviewed. No pertinent surgical history. Family History   Problem Relation Age of Onset    High Blood Pressure Mother     Heart Disease Maternal Grandmother     High Blood Pressure Maternal Grandmother     Heart Attack Maternal Grandmother     Hypertension Maternal Grandfather     Cancer Maternal Grandfather         LEUKEMIA    Diabetes Maternal Grandfather        Social History     Tobacco Use    Smoking status: Never Smoker    Smokeless tobacco: Never Used   Vaping Use    Vaping Use: Never used   Substance Use Topics    Alcohol use: No    Drug use: No       Current Outpatient Medications   Medication Sig Dispense Refill    UNITHROID 112 MCG tablet TAKE 1 TABLET BY MOUTH EVERY DAY 90 tablet 1    dilTIAZem (TIADYLT ER) 360 MG extended release capsule Take 1 capsule by mouth daily 90 capsule 3    topiramate (TOPAMAX) 25 MG tablet Take 1 tablet by mouth 2 times daily (Patient not taking: Reported on 10/20/2021) 60 tablet 2     No current facility-administered medications for this visit.        No Known Allergies    [id number]    Immunization History   Administered Date(s) Administered    DTaP (Infanrix) 03/26/1996, 06/03/1996, 08/19/1996, 11/03/1997, 03/28/2000    HPV Quadrivalent (Gardasil) 10/27/2009, 02/13/2010, 04/30/2010    Hepatitis A Ped/Adol (Havrix, Vaqta) 05/25/2007, 08/03/2009    Hepatitis B Ped/Adol (Engerix-B, Recombivax HB) 1995, 03/26/1996, 06/03/1996    Hib PRP-OMP (PedvaxHIB) 03/26/1996, 06/03/1996, 08/19/1996, 04/07/1997    Influenza Virus Vaccine 09/14/2009    MMR 04/17/1997, 03/28/2000    Meningococcal MCV4P (Menactra) 05/25/2007, 12/08/2011    PPD Test 08/19/1996, 03/28/1998    Polio IPV (IPOL) 03/26/1996, 06/03/1996, 08/19/1996, 03/28/2000    Tdap (Boostrix, Adacel) 05/25/2007, 07/01/2017    Varicella (Varivax) 11/19/1996, 05/25/2007       Review of Systems   Constitutional: Negative. Respiratory: Negative. Gastrointestinal: Negative. Genitourinary: Negative. /78   Pulse 88   Ht 5' 3\" (1.6 m)   Wt 123 lb (55.8 kg)   LMP 10/17/2021 (Exact Date)   BMI 21.79 kg/m²     Physical Exam  Vitals reviewed. Constitutional:       Appearance: Normal appearance. HENT:      Head: Normocephalic. Pulmonary:      Effort: Pulmonary effort is normal.   Chest:      Breasts:         Right: Normal.         Left: Normal.   Abdominal:      Palpations: Abdomen is soft. Genitourinary:     General: Normal vulva. Vagina: Normal.      Cervix: Normal.      Uterus: Normal.       Adnexa: Right adnexa normal and left adnexa normal.      Rectum: Normal.   Musculoskeletal:         General: Normal range of motion. Cervical back: Normal range of motion. Skin:     General: Skin is warm and dry. Neurological:      Mental Status: She is alert. Psychiatric:         Mood and Affect: Mood normal.         Behavior: Behavior normal.         No results found for this visit on 10/20/21. Assessment and Plan   Diagnosis Orders   1. Women's annual routine gynecological examination  PAP SMEAR    C.trachomatis N.gonorrhoeae DNA, Thin Prep   2. Dysmenorrhea         Dysmenorrhea; pt considering OCPs vs Liletta. Risks, benefits, bldg profile and ACHES reviewed. Pt will call with decision.

## 2021-10-25 DIAGNOSIS — A74.9 CHLAMYDIA: Primary | ICD-10-CM

## 2021-10-25 LAB
CHLAMYDIA BY THIN PREP: POSITIVE
GC BY THIN PREP: NEGATIVE

## 2021-10-25 RX ORDER — AZITHROMYCIN 500 MG/1
500 TABLET, FILM COATED ORAL DAILY
Qty: 2 TABLET | Refills: 0 | Status: SHIPPED | OUTPATIENT
Start: 2021-10-25 | End: 2022-08-17

## 2021-10-27 ENCOUNTER — OFFICE VISIT (OUTPATIENT)
Dept: CARDIOLOGY CLINIC | Age: 26
End: 2021-10-27
Payer: COMMERCIAL

## 2021-10-27 VITALS
BODY MASS INDEX: 21.9 KG/M2 | RESPIRATION RATE: 13 BRPM | SYSTOLIC BLOOD PRESSURE: 118 MMHG | HEIGHT: 63 IN | HEART RATE: 84 BPM | DIASTOLIC BLOOD PRESSURE: 80 MMHG | WEIGHT: 123.6 LBS | OXYGEN SATURATION: 99 %

## 2021-10-27 DIAGNOSIS — R00.2 PALPITATIONS: Primary | ICD-10-CM

## 2021-10-27 DIAGNOSIS — R07.9 CHEST PAIN, UNSPECIFIED TYPE: ICD-10-CM

## 2021-10-27 PROCEDURE — G8484 FLU IMMUNIZE NO ADMIN: HCPCS | Performed by: INTERNAL MEDICINE

## 2021-10-27 PROCEDURE — 99213 OFFICE O/P EST LOW 20 MIN: CPT | Performed by: INTERNAL MEDICINE

## 2021-10-27 PROCEDURE — G8420 CALC BMI NORM PARAMETERS: HCPCS | Performed by: INTERNAL MEDICINE

## 2021-10-27 PROCEDURE — 1036F TOBACCO NON-USER: CPT | Performed by: INTERNAL MEDICINE

## 2021-10-27 PROCEDURE — G8427 DOCREV CUR MEDS BY ELIG CLIN: HCPCS | Performed by: INTERNAL MEDICINE

## 2021-10-27 PROCEDURE — 93000 ELECTROCARDIOGRAM COMPLETE: CPT | Performed by: INTERNAL MEDICINE

## 2021-10-27 RX ORDER — PROPRANOLOL HYDROCHLORIDE 10 MG/1
10 TABLET ORAL 3 TIMES DAILY
Qty: 90 TABLET | Refills: 1 | Status: SHIPPED | OUTPATIENT
Start: 2021-10-27 | End: 2021-11-19

## 2021-10-27 ASSESSMENT — ENCOUNTER SYMPTOMS
SHORTNESS OF BREATH: 0
BLOOD IN STOOL: 0
CHEST TIGHTNESS: 0
CONSTIPATION: 0
BACK PAIN: 0
COUGH: 0
DIARRHEA: 0
PHOTOPHOBIA: 0
COLOR CHANGE: 0
ABDOMINAL PAIN: 0
EYE PAIN: 0
NAUSEA: 0
VOMITING: 0
WHEEZING: 0

## 2021-10-27 NOTE — PROGRESS NOTES
Electrophysiology fu Note      Reason for consultation:  Palpitations, tachycardia    Chief complaint :  Palpitations    Referring physician: Troy Jason      Primary care physician: Jacinta Lopez MD      History of Present Illness: This visit (10/27/2021)      Chief Complaint   Patient presents with    Follow-up     4mth f/u . Pt has had chest pain. Pt has been have flutters here and there. Pt denies any other symptoms. Pt doesn't drink caffine. Pt doesn't drink alcohol or smoke. Pt does exercise 1x a week. Previous visit:    Patient is a 75-year-old female referred by Dr. Bekah Bazzi. For palpitations and tachycardia. Patient reports this all started in January. Patient reports she felt her heart was racing she went to [facility] and she noted that she was in sinus tachycardia. Patient reports her heart rate went up to high as 180 and she reports that she was not doing much at all and her heart was racing.   She was told she had sinus tachycardia and Cardizem was started and then was referred to Dr. Juan Rothman and Troy Jason did the initial work-up and increased the Cardizem    Patient reports she has not had any episodes recently but she had bad episodes when she had to go to the hospital couple of times included the one in january    Patient denies any chest pain, shortness of breath, dizziness or syncope    Pastmedical history:   Past Medical History:   Diagnosis Date    Abnormal EKG     Chest heaviness     Family history of coronary artery disease     Palpitations     Tachycardia     Thyroid disease     hypothyroid       Surgical history : No recent surgeries    Family history:   Family History   Problem Relation Age of Onset    High Blood Pressure Mother     Heart Disease Maternal Grandmother     High Blood Pressure Maternal Grandmother     Heart Attack Maternal Grandmother     Hypertension Maternal Grandfather     Cancer Maternal Grandfather LEUKEMIA    Diabetes Maternal Grandfather        Social history :  reports that she has never smoked. She has never used smokeless tobacco. She reports that she does not drink alcohol and does not use drugs. No Known Allergies    Current Outpatient Medications on File Prior to Visit   Medication Sig Dispense Refill    azithromycin (ZITHROMAX) 500 MG tablet Take 1 tablet by mouth daily Take two 500mg tablets for a total of 1000mg 2 tablet 0    topiramate (TOPAMAX) 25 MG tablet Take 1 tablet by mouth 2 times daily 60 tablet 2    UNITHROID 112 MCG tablet TAKE 1 TABLET BY MOUTH EVERY DAY 90 tablet 1    dilTIAZem (TIADYLT ER) 360 MG extended release capsule Take 1 capsule by mouth daily 90 capsule 3     No current facility-administered medications on file prior to visit. Review of Systems:   Review of Systems   Constitutional: Negative for activity change, chills, fatigue and fever. HENT: Negative for congestion, ear pain and tinnitus. Eyes: Negative for photophobia, pain and visual disturbance. Respiratory: Negative for cough, chest tightness, shortness of breath and wheezing. Cardiovascular: Negative for chest pain, palpitations and leg swelling. Gastrointestinal: Negative for abdominal pain, blood in stool, constipation, diarrhea, nausea and vomiting. Endocrine: Negative for cold intolerance and heat intolerance. Genitourinary: Negative for dysuria, flank pain and hematuria. Musculoskeletal: Negative for arthralgias, back pain, myalgias and neck stiffness. Skin: Negative for color change and rash. Allergic/Immunologic: Negative for food allergies. Neurological: Negative for dizziness, light-headedness, numbness and headaches. Hematological: Does not bruise/bleed easily. Psychiatric/Behavioral: Negative for agitation, behavioral problems and confusion.            Examination:      Vitals:    10/27/21 1544   BP: 118/80   Pulse: 84   Resp: 13   SpO2: 99%   Weight: 123 lb 9.6 oz

## 2021-10-27 NOTE — PATIENT INSTRUCTIONS
Please be informed that if you contact our office outside of normal business hours the physician on call cannot help with any scheduling or rescheduling issues, procedure instruction questions or any type of medication issue. We advise you for any urgent/emergency that you go to the nearest emergency room! PLEASE CALL OUR OFFICE DURING NORMAL BUSINESS HOURS    Monday - Friday   8 am to 5 pm    [place]: [address]: [phone]    [place]:  [phone]    **It is YOUR responsibilty to bring medication bottles and/or updated medication list to [address] [place] [address].  This will allow us to better serve you and all your healthcare needs**

## 2021-10-29 LAB
HPV HIGH RISK: NOT DETECTED
HPV, GENOTYPE 16: NOT DETECTED
HPV, GENOTYPE 18: NOT DETECTED

## 2021-11-02 PROBLEM — R87.610 ASCUS OF CERVIX WITH NEGATIVE HIGH RISK HPV: Status: ACTIVE | Noted: 2021-11-02

## 2021-11-17 ENCOUNTER — HOSPITAL ENCOUNTER (OUTPATIENT)
Dept: CT IMAGING | Age: 26
Discharge: HOME | End: 2021-11-17

## 2021-11-17 DIAGNOSIS — R07.2 PRECORDIAL CHEST PAIN: ICD-10-CM

## 2021-11-17 PROCEDURE — 75574 CT ANGIO HRT W/3D IMAGE: CPT | Performed by: INTERNAL MEDICINE

## 2021-11-17 PROCEDURE — 6360000004 HC RX CONTRAST MEDICATION: Performed by: INTERNAL MEDICINE

## 2021-11-17 PROCEDURE — 75574 CT ANGIO HRT W/3D IMAGE: CPT

## 2021-11-17 RX ADMIN — IOPAMIDOL 120 ML: 755 INJECTION, SOLUTION INTRAVENOUS at 09:45

## 2021-11-19 RX ORDER — PROPRANOLOL HYDROCHLORIDE 10 MG/1
TABLET ORAL
Qty: 90 TABLET | Refills: 1 | Status: SHIPPED | OUTPATIENT
Start: 2021-11-19 | End: 2021-12-21

## 2021-11-24 ENCOUNTER — TELEPHONE (OUTPATIENT)
Dept: CARDIOLOGY CLINIC | Age: 26
End: 2021-11-24

## 2021-12-09 ENCOUNTER — TELEPHONE (OUTPATIENT)
Dept: OBGYN | Age: 26
End: 2021-12-09

## 2021-12-09 NOTE — TELEPHONE ENCOUNTER
Pt had an appt 10/20/21 and discussed starting oc, wants rx called in.  (resched.  yoli appt for 12/22/21)

## 2021-12-12 RX ORDER — NORGESTIMATE AND ETHINYL ESTRADIOL 0.25-0.035
1 KIT ORAL DAILY
Qty: 1 PACKET | Refills: 11 | Status: SHIPPED | OUTPATIENT
Start: 2021-12-12 | End: 2022-09-19 | Stop reason: ALTCHOICE

## 2021-12-21 RX ORDER — PROPRANOLOL HYDROCHLORIDE 10 MG/1
TABLET ORAL
Qty: 90 TABLET | Refills: 3 | Status: SHIPPED | OUTPATIENT
Start: 2021-12-21 | End: 2022-02-08 | Stop reason: SDUPTHER

## 2021-12-22 ENCOUNTER — OFFICE VISIT (OUTPATIENT)
Dept: OBGYN | Age: 26
End: 2021-12-22
Payer: COMMERCIAL

## 2021-12-22 VITALS
SYSTOLIC BLOOD PRESSURE: 104 MMHG | DIASTOLIC BLOOD PRESSURE: 73 MMHG | BODY MASS INDEX: 22.15 KG/M2 | HEART RATE: 77 BPM | HEIGHT: 63 IN | WEIGHT: 125 LBS

## 2021-12-22 DIAGNOSIS — Z86.19 HISTORY OF CHLAMYDIA: Primary | ICD-10-CM

## 2021-12-22 DIAGNOSIS — N89.8 VAGINAL DISCHARGE: ICD-10-CM

## 2021-12-22 PROCEDURE — 1036F TOBACCO NON-USER: CPT

## 2021-12-22 PROCEDURE — G8420 CALC BMI NORM PARAMETERS: HCPCS

## 2021-12-22 PROCEDURE — 99213 OFFICE O/P EST LOW 20 MIN: CPT

## 2021-12-22 PROCEDURE — G8427 DOCREV CUR MEDS BY ELIG CLIN: HCPCS

## 2021-12-22 PROCEDURE — G8484 FLU IMMUNIZE NO ADMIN: HCPCS

## 2021-12-22 ASSESSMENT — ENCOUNTER SYMPTOMS
RESPIRATORY NEGATIVE: 1
ABDOMINAL PAIN: 0
GASTROINTESTINAL NEGATIVE: 1

## 2021-12-22 NOTE — PROGRESS NOTES
12/22/21    Milo Salazarnyroyal  1995    Chief Complaint   Patient presents with    Follow-up     pt here for yoli due to positive chlamydia from pap in 10/2021, no c/o today. Julia Valdovinos is a 32 y.o. female who presents today for evaluation of yoli following positive chlamydia in October on pap. Pt reports completing antibiotics, notes some discharge but states she has always felt she has more discharge than average. Past Medical History:   Diagnosis Date    Abnormal computed tomography angiography of heart 11/18/2021    Arterially enhancing lesions in the liver as above are likely hemangiomas, although other etiologies including vascular shunts could appear similar. Recommend further evaluation with liver protocol abdomen MRI    Abnormal EKG     Chest heaviness     Family history of coronary artery disease     Palpitations     Tachycardia     Thyroid disease     hypothyroid       History reviewed. No pertinent surgical history.     Social History     Tobacco Use    Smoking status: Never Smoker    Smokeless tobacco: Never Used   Vaping Use    Vaping Use: Never used   Substance Use Topics    Alcohol use: No    Drug use: No       Family History   Problem Relation Age of Onset    High Blood Pressure Mother     Heart Disease Maternal Grandmother     High Blood Pressure Maternal Grandmother     Heart Attack Maternal Grandmother     Hypertension Maternal Grandfather     Cancer Maternal Grandfather         LEUKEMIA    Diabetes Maternal Grandfather        Current Outpatient Medications   Medication Sig Dispense Refill    propranolol (INDERAL) 10 MG tablet TAKE 1 TABLET BY MOUTH THREE TIMES A DAY 90 tablet 3    norgestimate-ethinyl estradiol (SPRINTEC 28) 0.25-35 MG-MCG per tablet Take 1 tablet by mouth daily for 28 days 1 packet 11    UNITHROID 112 MCG tablet TAKE 1 TABLET BY MOUTH EVERY DAY 90 tablet 1    dilTIAZem (TIADYLT ER) 360 MG extended release capsule Take 1 capsule by mouth daily 90 capsule 3    azithromycin (ZITHROMAX) 500 MG tablet Take 1 tablet by mouth daily Take two 500mg tablets for a total of 1000mg (Patient not taking: Reported on 12/22/2021) 2 tablet 0    topiramate (TOPAMAX) 25 MG tablet Take 1 tablet by mouth 2 times daily (Patient not taking: Reported on 12/22/2021) 60 tablet 2     No current facility-administered medications for this visit. No Known Allergies    [id number]    Immunization History   Administered Date(s) Administered    DTaP (Infanrix) 03/26/1996, 06/03/1996, 08/19/1996, 11/03/1997, 03/28/2000    HPV Quadrivalent (Gardasil) 10/27/2009, 02/13/2010, 04/30/2010    Hepatitis A Ped/Adol (Havrix, Vaqta) 05/25/2007, 08/03/2009    Hepatitis B Ped/Adol (Engerix-B, Recombivax HB) 1995, 03/26/1996, 06/03/1996    Hib PRP-OMP (PedvaxHIB) 03/26/1996, 06/03/1996, 08/19/1996, 04/07/1997    Influenza Virus Vaccine 09/14/2009    MMR 04/17/1997, 03/28/2000    Meningococcal MCV4P (Menactra) 05/25/2007, 12/08/2011    PPD Test 08/19/1996, 03/28/1998    Polio IPV (IPOL) 03/26/1996, 06/03/1996, 08/19/1996, 03/28/2000    Tdap (Boostrix, Adacel) 05/25/2007, 07/01/2017    Varicella (Varivax) 11/19/1996, 05/25/2007       Review of Systems   Constitutional: Negative. Negative for fatigue and fever. Respiratory: Negative. Gastrointestinal: Negative. Negative for abdominal pain. Endocrine: Negative. Genitourinary: Positive for vaginal discharge. Negative for dysuria, menstrual problem and vaginal pain. Musculoskeletal: Negative. Skin: Negative. Neurological: Negative. Negative for dizziness and headaches. Psychiatric/Behavioral: Negative. /73   Pulse 77   Ht 5' 3\" (1.6 m)   Wt 125 lb (56.7 kg)   LMP 12/01/2021 (Approximate)   BMI 22.14 kg/m²     Physical Exam  Vitals and nursing note reviewed. Constitutional:       General: She is not in acute distress. Appearance: Normal appearance. She is normal weight.    HENT: Head: Normocephalic and atraumatic. Pulmonary:      Effort: Pulmonary effort is normal. No respiratory distress. Abdominal:      Palpations: Abdomen is soft. Tenderness: There is no abdominal tenderness. Genitourinary:     General: Normal vulva. Exam position: Lithotomy position. Vagina: Vaginal discharge present. Cervix: Normal.   Musculoskeletal:         General: Normal range of motion. Cervical back: Normal range of motion. Skin:     General: Skin is warm and dry. Neurological:      General: No focal deficit present. Mental Status: She is alert and oriented to person, place, and time. Psychiatric:         Mood and Affect: Mood normal.         Speech: Speech normal.         Behavior: Behavior normal.         Thought Content: Thought content normal.         No results found for this visit on 12/22/21. ASSESSMENT AND PLAN   Diagnosis Orders   1. History of chlamydia  Vaginal Pathogens Probes *A    C.trachomatis N.gonorrhoeae DNA   2. Vaginal discharge  Vaginal Pathogens Probes *A    C.trachomatis N.gonorrhoeae DNA     Swabs today, will call with positive or negative result. Return if symptoms worsen or fail to improve.     Mirian Pope PA-C

## 2021-12-23 LAB
C TRACH DNA GENITAL QL NAA+PROBE: NEGATIVE
CANDIDA SPECIES, DNA PROBE: ABNORMAL
GARDNERELLA VAGINALIS, DNA PROBE: ABNORMAL
N. GONORRHOEAE DNA: NEGATIVE
TRICHOMONAS VAGINALIS DNA: ABNORMAL

## 2021-12-27 RX ORDER — METRONIDAZOLE 500 MG/1
500 TABLET ORAL 2 TIMES DAILY
Qty: 14 TABLET | Refills: 0 | Status: SHIPPED | OUTPATIENT
Start: 2021-12-27 | End: 2022-01-03

## 2022-01-12 ENCOUNTER — TELEPHONE (OUTPATIENT)
Dept: CARDIOLOGY CLINIC | Age: 27
End: 2022-01-12

## 2022-01-12 NOTE — TELEPHONE ENCOUNTER
Left message for pt to call back I need to r/s her appt lizeth/Jos on the 26th he will be out of the office

## 2022-01-25 ENCOUNTER — TELEPHONE (OUTPATIENT)
Dept: CARDIOLOGY CLINIC | Age: 27
End: 2022-01-25

## 2022-01-26 ENCOUNTER — TELEPHONE (OUTPATIENT)
Dept: CARDIOLOGY CLINIC | Age: 27
End: 2022-01-26

## 2022-01-26 NOTE — TELEPHONE ENCOUNTER
Pt called to r/s with Marcie Lopez.  She is self pay now wants to know if we can give her a NephroPlus price of ov

## 2022-02-08 RX ORDER — PROPRANOLOL HYDROCHLORIDE 10 MG/1
TABLET ORAL
Qty: 90 TABLET | Refills: 3 | Status: SHIPPED | OUTPATIENT
Start: 2022-02-08 | End: 2022-04-20 | Stop reason: SDUPTHER

## 2022-02-08 NOTE — TELEPHONE ENCOUNTER
Patient called to reschedule cancelled appointment. Also requesting refill for propanolol. Patient states she will be out before office visit. Will pend prescription to LISBET Jarvis to review and approve.

## 2022-03-02 ENCOUNTER — TELEPHONE (OUTPATIENT)
Dept: CARDIOLOGY CLINIC | Age: 27
End: 2022-03-02

## 2022-03-03 NOTE — TELEPHONE ENCOUNTER
Tried to return phone call to patient to discuss rescheduling cancelled appointment. No answer. Message left asking that patient return phone call when available.

## 2022-03-29 ENCOUNTER — TELEPHONE (OUTPATIENT)
Dept: CARDIOLOGY CLINIC | Age: 27
End: 2022-03-29

## 2022-03-30 ENCOUNTER — TELEPHONE (OUTPATIENT)
Dept: CARDIOLOGY CLINIC | Age: 27
End: 2022-03-30

## 2022-03-30 NOTE — TELEPHONE ENCOUNTER
Tried to return phone call to patient. No answer and unable to leave message as mailbox is full. Will try patient again at a later time.

## 2022-03-31 RX ORDER — DILTIAZEM HYDROCHLORIDE 360 MG/1
CAPSULE, EXTENDED RELEASE ORAL
Qty: 90 CAPSULE | Refills: 3 | Status: SHIPPED | OUTPATIENT
Start: 2022-03-31 | End: 2022-04-20 | Stop reason: SDUPTHER

## 2022-04-20 ENCOUNTER — OFFICE VISIT (OUTPATIENT)
Dept: CARDIOLOGY CLINIC | Age: 27
End: 2022-04-20
Payer: COMMERCIAL

## 2022-04-20 VITALS
DIASTOLIC BLOOD PRESSURE: 62 MMHG | SYSTOLIC BLOOD PRESSURE: 100 MMHG | HEART RATE: 61 BPM | HEIGHT: 63 IN | BODY MASS INDEX: 21.97 KG/M2 | WEIGHT: 124 LBS

## 2022-04-20 DIAGNOSIS — R00.2 PALPITATIONS: Primary | ICD-10-CM

## 2022-04-20 PROCEDURE — 99213 OFFICE O/P EST LOW 20 MIN: CPT | Performed by: INTERNAL MEDICINE

## 2022-04-20 PROCEDURE — 93000 ELECTROCARDIOGRAM COMPLETE: CPT | Performed by: INTERNAL MEDICINE

## 2022-04-20 RX ORDER — PROPRANOLOL HYDROCHLORIDE 10 MG/1
TABLET ORAL
Qty: 90 TABLET | Refills: 3 | Status: SHIPPED | OUTPATIENT
Start: 2022-04-20 | End: 2022-08-17 | Stop reason: SDUPTHER

## 2022-04-20 RX ORDER — DILTIAZEM HYDROCHLORIDE 360 MG/1
CAPSULE, EXTENDED RELEASE ORAL
Qty: 90 CAPSULE | Refills: 3 | Status: SHIPPED | OUTPATIENT
Start: 2022-04-20 | End: 2022-08-17

## 2022-04-20 ASSESSMENT — ENCOUNTER SYMPTOMS
COLOR CHANGE: 0
WHEEZING: 0
EYE PAIN: 0
VOMITING: 0
PHOTOPHOBIA: 0
BLOOD IN STOOL: 0
BACK PAIN: 0
SHORTNESS OF BREATH: 0
CHEST TIGHTNESS: 0
DIARRHEA: 0
NAUSEA: 0
CONSTIPATION: 0
COUGH: 0
ABDOMINAL PAIN: 0

## 2022-04-20 NOTE — PATIENT INSTRUCTIONS
After  [facility] Laboratory Locations - No appointment necessary. Sites open Monday to Friday. Call your preferred location for test preparation, business hours and other information you need. [employer] [facility] [employer]. [place] [facility] [address] [facility], [address] [facility]  Phone: [phone] [facility] [address] [facility]  [address]. [place] [facility], [address] [facility]  Phone: [phone]     **It is YOUR responsibilty to bring medication bottles and/or updated medication list to [address] [place] [address].  This will allow us to better serve you and all your healthcare needs**

## 2022-04-20 NOTE — PROGRESS NOTES
Electrophysiology fu Note      Reason for consultation:  Palpitations, tachycardia    Chief complaint :  3 month follow for palpitations    Referring physician: Cristina Gordon      Primary care physician: Luciana Macedo MD      History of Present Illness: This visit (4/20/2022)      Chief Complaint   Patient presents with    Follow-up     3 mo Pt denies any new cardiac sx, no surgeries or procedures scheduled that she is aware of, she will need refills . Pt denies alcohol use and smoking. Patient has occasional chest pain which is sharp last few minutes. No chest pain today    Palpitations           Previous visit: (10/27/2021)      Chief Complaint   Patient presents with    Follow-up     4mth f/u . Pt has had chest pain. Pt has been have flutters here and there. Pt denies any other symptoms. Pt doesn't drink caffine. Pt doesn't drink alcohol or smoke. Pt does exercise 1x a week. Previous visit:    Patient is a 25-year-old female referred by Dr. Girma Trinidad. For palpitations and tachycardia. Patient reports this all started in January. Patient reports she felt her heart was racing she went to [address] [place] [address] and she noted that she was in sinus tachycardia. Patient reports her heart rate went up to high as 180 and she reports that she was not doing much at all and her heart was racing.   She was told she had sinus tachycardia and Cardizem was started and then was referred to Dr. Millicent Mcadams and Cristina Gordon did the initial work-up and increased the Cardizem    Patient reports she has not had any episodes recently but she had bad episodes when she had to go to the hospital couple of times included the one in january    Patient denies any chest pain, shortness of breath, dizziness or syncope    Pastmedical history:   Past Medical History:   Diagnosis Date    Abnormal computed tomography angiography of heart 11/18/2021    Arterially enhancing lesions in the liver as above are likely hemangiomas, although other etiologies including vascular shunts could appear similar. Recommend further evaluation with liver protocol abdomen MRI    Abnormal EKG     Chest heaviness     Family history of coronary artery disease     Palpitations     Tachycardia     Thyroid disease     hypothyroid       Surgical history : No recent surgeries    Family history:   Family History   Problem Relation Age of Onset    High Blood Pressure Mother     Heart Disease Maternal Grandmother     High Blood Pressure Maternal Grandmother     Heart Attack Maternal Grandmother     Hypertension Maternal Grandfather     Cancer Maternal Grandfather         LEUKEMIA    Diabetes Maternal Grandfather        Social history :  reports that she has never smoked. She has never used smokeless tobacco. She reports that she does not drink alcohol and does not use drugs. No Known Allergies    Current Outpatient Medications on File Prior to Visit   Medication Sig Dispense Refill    TIADYLT  MG extended release capsule TAKE 1 CAPSULE BY MOUTH EVERY DAY 90 capsule 3    propranolol (INDERAL) 10 MG tablet TAKE 1 TABLET BY MOUTH THREE TIMES A DAY 90 tablet 3    norgestimate-ethinyl estradiol (SPRINTEC 28) 0.25-35 MG-MCG per tablet Take 1 tablet by mouth daily for 28 days 1 packet 11    UNITHROID 112 MCG tablet TAKE 1 TABLET BY MOUTH EVERY DAY 90 tablet 1    azithromycin (ZITHROMAX) 500 MG tablet Take 1 tablet by mouth daily Take two 500mg tablets for a total of 1000mg (Patient not taking: Reported on 12/22/2021) 2 tablet 0    topiramate (TOPAMAX) 25 MG tablet Take 1 tablet by mouth 2 times daily (Patient not taking: Reported on 12/22/2021) 60 tablet 2     No current facility-administered medications on file prior to visit. Review of Systems:   Review of Systems   Constitutional: Negative for activity change, chills, fatigue and fever. HENT: Negative for congestion, ear pain and tinnitus.     Eyes: Negative for photophobia, pain and visual disturbance. Respiratory: Negative for cough, chest tightness, shortness of breath and wheezing. Cardiovascular: Negative for chest pain, palpitations and leg swelling. Gastrointestinal: Negative for abdominal pain, blood in stool, constipation, diarrhea, nausea and vomiting. Endocrine: Negative for cold intolerance and heat intolerance. Genitourinary: Negative for dysuria, flank pain and hematuria. Musculoskeletal: Negative for arthralgias, back pain, myalgias and neck stiffness. Skin: Negative for color change and rash. Allergic/Immunologic: Negative for food allergies. Neurological: Negative for dizziness, light-headedness, numbness and headaches. Hematological: Does not bruise/bleed easily. Psychiatric/Behavioral: Negative for agitation, behavioral problems and confusion. Examination:      Vitals:    04/20/22 1357   BP: 100/62   Pulse: 61   Weight: 124 lb (56.2 kg)   Height: 5' 3\" (1.6 m)        Body mass index is 21.97 kg/m². Physical Exam  HENT:      Head: Normocephalic and atraumatic. Eyes:      General:         Right eye: No discharge. Conjunctiva/sclera: Conjunctivae normal.      Pupils: Pupils are equal, round, and reactive to light. Neck:      Thyroid: No thyromegaly. Vascular: No JVD. Cardiovascular:      Rate and Rhythm: Normal rate and regular rhythm. Heart sounds: Normal heart sounds. No murmur heard. No friction rub. Pulmonary:      Effort: Pulmonary effort is normal. No respiratory distress. Breath sounds: Normal breath sounds. No stridor. No wheezing. Abdominal:      General: Bowel sounds are normal. There is no distension. Palpations: Abdomen is soft. Tenderness: There is no abdominal tenderness. Musculoskeletal:         General: No tenderness. Normal range of motion. Cervical back: Normal range of motion. Skin:     General: Skin is warm and dry. Findings: No erythema or rash. Neurological:      Mental Status: She is alert and oriented to person, place, and time. Cranial Nerves: No cranial nerve deficit. Coordination: Coordination normal.      Deep Tendon Reflexes: Reflexes normal.           CBC:   Lab Results   Component Value Date    WBC 9.7 03/08/2021    HGB 14.4 03/08/2021    HCT 43.2 03/08/2021     03/08/2021     Lipids:  Lab Results   Component Value Date    CHOL 167 09/19/2020    TRIG 48 09/19/2020    HDL 71 09/19/2020    LDLDIRECT 86 09/19/2020     PT/INR: No results found for: INR     BMP:    Lab Results   Component Value Date     03/08/2021    K 3.5 03/08/2021     03/08/2021    CO2 22 03/08/2021    BUN 13 03/08/2021     CMP:   Lab Results   Component Value Date    AST 18 03/08/2021    PROT 7.4 03/08/2021    BILITOT 0.3 03/08/2021    ALKPHOS 59 03/08/2021     TSH:  No results found for: TSH, T4    EKGINTERPRETATION - EKG Interpretation:  Sinus rhythm      IMPRESSION / RECOMMENDATIONS:     Palpitations  Hypothyroid on levothyroxine  Non cardiac chest pain    Patient feels much better since she started on propranolol. Patient reports heart rate has been very good in 60s and 70s. Patient has occasional chest pain which is sharp - I told her to watch out for any relation with food to see if it is any gastric induced pain as her stress test previously was normal    Patient said she will watch for it. The plan would be to eventually wean her off Cardizem and keep her on propranolol as she has inappropriate sinus tachycardia    I will follow in 4 months if patient still continues to do well then I will decrease the Cardizem dosing to half a tablet and then uptitrated propranolol if needed. But plan would be to wean off Cardizem and keep her on propranolol if possible and wean her off propranolol eventually      Thanks again for allowing me to participate in care of this patient. Please call me if you have any questions.     With best regards. Jewel Lefort, MD, 4/20/2022 2:13 PM     Please note this report has been partially produced using speech recognition software and may contain errors related to that system including errors in grammar, punctuation, and spelling, as well as words and phrases that may be inappropriate. If there are any questions or concerns please feel free to contact the dictating provider for clarification.

## 2022-06-02 RX ORDER — DILTIAZEM HYDROCHLORIDE 360 MG/1
CAPSULE, EXTENDED RELEASE ORAL
Qty: 90 CAPSULE | Refills: 3 | Status: CANCELLED | OUTPATIENT
Start: 2022-06-02

## 2022-06-02 NOTE — TELEPHONE ENCOUNTER
90 day supply with 3 refills sent to pharmacy during last office visit in April. Called and spoke with pharmacist who stated when prescription initially sent in they filled and due to patient not picking up they returned prescription. Stated they will get medication ready for patient . Called and spoke with patient, she is aware and voiced understanding.

## 2022-07-20 ENCOUNTER — OFFICE VISIT (OUTPATIENT)
Dept: OBGYN | Age: 27
End: 2022-07-20
Payer: COMMERCIAL

## 2022-07-20 VITALS
SYSTOLIC BLOOD PRESSURE: 117 MMHG | HEIGHT: 63 IN | BODY MASS INDEX: 21.62 KG/M2 | WEIGHT: 122 LBS | DIASTOLIC BLOOD PRESSURE: 52 MMHG

## 2022-07-20 DIAGNOSIS — N89.8 VAGINAL DISCHARGE: Primary | ICD-10-CM

## 2022-07-20 PROCEDURE — 99213 OFFICE O/P EST LOW 20 MIN: CPT | Performed by: NURSE PRACTITIONER

## 2022-07-20 ASSESSMENT — PATIENT HEALTH QUESTIONNAIRE - PHQ9
2. FEELING DOWN, DEPRESSED OR HOPELESS: 0
SUM OF ALL RESPONSES TO PHQ QUESTIONS 1-9: 0
SUM OF ALL RESPONSES TO PHQ QUESTIONS 1-9: 0
1. LITTLE INTEREST OR PLEASURE IN DOING THINGS: 0
SUM OF ALL RESPONSES TO PHQ QUESTIONS 1-9: 0
SUM OF ALL RESPONSES TO PHQ QUESTIONS 1-9: 0
SUM OF ALL RESPONSES TO PHQ9 QUESTIONS 1 & 2: 0

## 2022-07-20 ASSESSMENT — ENCOUNTER SYMPTOMS
RESPIRATORY NEGATIVE: 1
GASTROINTESTINAL NEGATIVE: 1

## 2022-07-20 NOTE — PROGRESS NOTES
7/20/22    Milo Miramontes  1995    Chief Complaint   Patient presents with    Vaginal Discharge     C/o yellow/green discharge x2-3 wks . cruz Erickson is a 32 y.o. female who presents today for evaluation of vaginal discharge    Past Medical History:   Diagnosis Date    Abnormal computed tomography angiography of heart 11/18/2021    Arterially enhancing lesions in the liver as above are likely hemangiomas, although other etiologies including vascular shunts could appear similar. Recommend further evaluation with liver protocol abdomen MRI    Abnormal EKG     Chest heaviness     Family history of coronary artery disease     Palpitations     Tachycardia     Thyroid disease     hypothyroid    Vaginal discharge        No past surgical history on file.     Social History     Tobacco Use    Smoking status: Never    Smokeless tobacco: Never   Vaping Use    Vaping Use: Never used   Substance Use Topics    Alcohol use: No    Drug use: No       Family History   Problem Relation Age of Onset    High Blood Pressure Mother     Heart Disease Maternal Grandmother     High Blood Pressure Maternal Grandmother     Heart Attack Maternal Grandmother     Hypertension Maternal Grandfather     Cancer Maternal Grandfather         LEUKEMIA    Diabetes Maternal Grandfather        Current Outpatient Medications   Medication Sig Dispense Refill    propranolol (INDERAL) 10 MG tablet TAKE 1 TABLET BY MOUTH THREE TIMES A DAY 90 tablet 3    dilTIAZem (TIADYLT ER) 360 MG extended release capsule TAKE 1 CAPSULE BY MOUTH EVERY DAY 90 capsule 3    UNITHROID 112 MCG tablet TAKE 1 TABLET BY MOUTH EVERY DAY 90 tablet 1    norgestimate-ethinyl estradiol (SPRINTEC 28) 0.25-35 MG-MCG per tablet Take 1 tablet by mouth daily for 28 days 1 packet 11    azithromycin (ZITHROMAX) 500 MG tablet Take 1 tablet by mouth daily Take two 500mg tablets for a total of 1000mg (Patient not taking: Reported on 12/22/2021) 2 tablet 0    topiramate (TOPAMAX) 25 MG tablet Take 1 tablet by mouth 2 times daily (Patient not taking: Reported on 12/22/2021) 60 tablet 2     No current facility-administered medications for this visit. No Known Allergies    [id number]    Immunization History   Administered Date(s) Administered    DTaP (Infanrix) 03/26/1996, 06/03/1996, 08/19/1996, 11/03/1997, 03/28/2000    HPV Quadrivalent (Gardasil) 10/27/2009, 02/13/2010, 04/30/2010    Hepatitis A Ped/Adol (Havrix, Vaqta) 05/25/2007, 08/03/2009    Hepatitis B Ped/Adol (Engerix-B, Recombivax HB) 1995, 03/26/1996, 06/03/1996    Hib PRP-OMP (PedvaxHIB) 03/26/1996, 06/03/1996, 08/19/1996, 04/07/1997    Influenza Virus Vaccine 09/14/2009    MMR 04/17/1997, 03/28/2000    Meningococcal MCV4P (Menactra) 05/25/2007, 12/08/2011    PPD Test 08/19/1996, 03/28/1998    Polio IPV (IPOL) 03/26/1996, 06/03/1996, 08/19/1996, 03/28/2000    Tdap (Boostrix, Adacel) 05/25/2007, 07/01/2017    Varicella (Varivax) 11/19/1996, 05/25/2007       Review of Systems   Constitutional: Negative. Respiratory: Negative. Gastrointestinal: Negative. Genitourinary:  Positive for vaginal discharge. BP (!) 117/52   Ht 5' 3\" (1.6 m)   Wt 122 lb (55.3 kg)   LMP 07/03/2022 Comment: ocp  BMI 21.61 kg/m²     Physical Exam  Vitals and nursing note reviewed. Constitutional:       Appearance: Normal appearance. She is normal weight. HENT:      Head: Normocephalic. Pulmonary:      Effort: Pulmonary effort is normal.   Abdominal:      Palpations: Abdomen is soft. Genitourinary:     General: Normal vulva. Vagina: Vaginal discharge (yellow/mucus) present. Cervix: Normal.      Uterus: Normal.       Adnexa: Right adnexa normal and left adnexa normal.      Rectum: Normal.   Musculoskeletal:         General: Normal range of motion. Cervical back: Normal range of motion. Skin:     General: Skin is warm and dry. Neurological:      Mental Status: She is alert.    Psychiatric:         Attention and Perception: Attention normal.         Mood and Affect: Mood normal.       No results found for this visit on 07/20/22. ASSESSMENT AND PLAN   Diagnosis Orders   1. Vaginal discharge          Vaginal hygiene reviewed  Trial daily probiotic and boric acid suppositories  Declines STD screening        No follow-ups on file.     Pako Tijerina, APRN - CNP

## 2022-07-21 LAB
CANDIDA SPECIES, DNA PROBE: ABNORMAL
GARDNERELLA VAGINALIS, DNA PROBE: ABNORMAL
TRICHOMONAS VAGINALIS DNA: ABNORMAL

## 2022-07-24 RX ORDER — CLINDAMYCIN HYDROCHLORIDE 300 MG/1
300 CAPSULE ORAL 2 TIMES DAILY
Qty: 14 CAPSULE | Refills: 0 | Status: SHIPPED | OUTPATIENT
Start: 2022-07-24 | End: 2022-07-31

## 2022-08-17 ENCOUNTER — OFFICE VISIT (OUTPATIENT)
Dept: CARDIOLOGY CLINIC | Age: 27
End: 2022-08-17
Payer: COMMERCIAL

## 2022-08-17 VITALS
HEART RATE: 104 BPM | DIASTOLIC BLOOD PRESSURE: 68 MMHG | SYSTOLIC BLOOD PRESSURE: 96 MMHG | HEIGHT: 64 IN | WEIGHT: 124.2 LBS | BODY MASS INDEX: 21.21 KG/M2

## 2022-08-17 DIAGNOSIS — R00.2 PALPITATIONS: Primary | ICD-10-CM

## 2022-08-17 PROCEDURE — 93000 ELECTROCARDIOGRAM COMPLETE: CPT | Performed by: INTERNAL MEDICINE

## 2022-08-17 PROCEDURE — 99213 OFFICE O/P EST LOW 20 MIN: CPT | Performed by: INTERNAL MEDICINE

## 2022-08-17 RX ORDER — DILTIAZEM HYDROCHLORIDE 180 MG/1
CAPSULE, EXTENDED RELEASE ORAL
Qty: 30 CAPSULE | Refills: 1 | Status: SHIPPED | OUTPATIENT
Start: 2022-08-17 | End: 2022-09-12

## 2022-08-17 RX ORDER — PROPRANOLOL HYDROCHLORIDE 20 MG/1
TABLET ORAL
Qty: 90 TABLET | Refills: 2 | Status: SHIPPED | OUTPATIENT
Start: 2022-08-17 | End: 2022-09-12

## 2022-08-17 ASSESSMENT — ENCOUNTER SYMPTOMS
WHEEZING: 0
BACK PAIN: 0
CHEST TIGHTNESS: 0
COUGH: 0
EYE PAIN: 0
SHORTNESS OF BREATH: 0
BLOOD IN STOOL: 0
ABDOMINAL PAIN: 0
COLOR CHANGE: 0
VOMITING: 0
PHOTOPHOBIA: 0
CONSTIPATION: 0
NAUSEA: 0
DIARRHEA: 0

## 2022-08-17 NOTE — PROGRESS NOTES
Electrophysiology fu Note      Reason for consultation:  Palpitations, tachycardia    Chief complaint :  4 month follow for palpitations    Referring physician: Dean De La O      Primary care physician: Gisela Plascencia MD      History of Present Illness: This visit (8/17/2022)      Chief Complaint   Patient presents with    Follow-up     Pt denies other cardiac symptoms. Pt does not drink or smoke. Pt does not drink caffeine. Pt is getting some exercise. Palpitations     Random - flutters           Previous visit: (4/20/2022)      Chief Complaint   Patient presents with    Follow-up     3 mo Pt denies any new cardiac sx, no surgeries or procedures scheduled that she is aware of, she will need refills . Pt denies alcohol use and smoking. Patient has occasional chest pain which is sharp last few minutes. No chest pain today    Palpitations           Previous visit: (10/27/2021)      Chief Complaint   Patient presents with    Follow-up     4mth f/u . Pt has had chest pain. Pt has been have flutters here and there. Pt denies any other symptoms. Pt doesn't drink caffine. Pt doesn't drink alcohol or smoke. Pt does exercise 1x a week. Previous visit:    Patient is a 66-year-old female referred by Dr. Hanna Aguilar. For palpitations and tachycardia. Patient reports this all started in January. Patient reports she felt her heart was racing she went to [facility] and she noted that she was in sinus tachycardia. Patient reports her heart rate went up to high as 180 and she reports that she was not doing much at all and her heart was racing.   She was told she had sinus tachycardia and Cardizem was started and then was referred to Dr. Samantha Madrigal and Dean De La O did the initial work-up and increased the Cardizem    Patient reports she has not had any episodes recently but she had bad episodes when she had to go to the hospital couple of times included the one in january    Patient denies any chest pain, shortness of breath, dizziness or syncope    Pastmedical history:   Past Medical History:   Diagnosis Date    Abnormal computed tomography angiography of heart 11/18/2021    Arterially enhancing lesions in the liver as above are likely hemangiomas, although other etiologies including vascular shunts could appear similar. Recommend further evaluation with liver protocol abdomen MRI    Abnormal EKG     Chest heaviness     Family history of coronary artery disease     Palpitations     Tachycardia     Thyroid disease     hypothyroid    Vaginal discharge        Surgical history : No recent surgeries    Family history:   Family History   Problem Relation Age of Onset    High Blood Pressure Mother     Heart Disease Maternal Grandmother     High Blood Pressure Maternal Grandmother     Heart Attack Maternal Grandmother     Hypertension Maternal Grandfather     Cancer Maternal Grandfather         LEUKEMIA    Diabetes Maternal Grandfather        Social history :  reports that she has never smoked. She has never used smokeless tobacco. She reports that she does not drink alcohol and does not use drugs. No Known Allergies    Current Outpatient Medications on File Prior to Visit   Medication Sig Dispense Refill    propranolol (INDERAL) 10 MG tablet TAKE 1 TABLET BY MOUTH THREE TIMES A DAY 90 tablet 3    dilTIAZem (TIADYLT ER) 360 MG extended release capsule TAKE 1 CAPSULE BY MOUTH EVERY DAY 90 capsule 3    norgestimate-ethinyl estradiol (SPRINTEC 28) 0.25-35 MG-MCG per tablet Take 1 tablet by mouth daily for 28 days 1 packet 11    UNITHROID 112 MCG tablet TAKE 1 TABLET BY MOUTH EVERY DAY 90 tablet 1     No current facility-administered medications on file prior to visit. Review of Systems:   Review of Systems   Constitutional:  Negative for activity change, chills, fatigue and fever. HENT:  Negative for congestion, ear pain and tinnitus.     Eyes:  Negative for photophobia, pain and visual disturbance. Respiratory:  Negative for cough, chest tightness, shortness of breath and wheezing. Cardiovascular:  Negative for chest pain, palpitations and leg swelling. Gastrointestinal:  Negative for abdominal pain, blood in stool, constipation, diarrhea, nausea and vomiting. Endocrine: Negative for cold intolerance and heat intolerance. Genitourinary:  Negative for dysuria, flank pain and hematuria. Musculoskeletal:  Negative for arthralgias, back pain, myalgias and neck stiffness. Skin:  Negative for color change and rash. Allergic/Immunologic: Negative for food allergies. Neurological:  Negative for dizziness, light-headedness, numbness and headaches. Hematological:  Does not bruise/bleed easily. Psychiatric/Behavioral:  Negative for agitation, behavioral problems and confusion. Examination:      Vitals:    08/17/22 1301   BP: 96/68   Site: Left Upper Arm   Position: Sitting   Cuff Size: Medium Adult   Pulse: (!) 104   Weight: 124 lb 3.2 oz (56.3 kg)   Height: 5' 4\" (1.626 m)        Body mass index is 21.32 kg/m². Physical Exam  HENT:      Head: Normocephalic and atraumatic. Eyes:      General:         Right eye: No discharge. Conjunctiva/sclera: Conjunctivae normal.      Pupils: Pupils are equal, round, and reactive to light. Neck:      Thyroid: No thyromegaly. Vascular: No JVD. Cardiovascular:      Rate and Rhythm: Normal rate and regular rhythm. Heart sounds: Normal heart sounds. No murmur heard. No friction rub. Pulmonary:      Effort: Pulmonary effort is normal. No respiratory distress. Breath sounds: Normal breath sounds. No stridor. No wheezing. Abdominal:      General: Bowel sounds are normal. There is no distension. Palpations: Abdomen is soft. Tenderness: There is no abdominal tenderness. Musculoskeletal:         General: No tenderness. Normal range of motion. Cervical back: Normal range of motion.    Skin: General: Skin is warm and dry. Findings: No erythema or rash. Neurological:      Mental Status: She is alert and oriented to person, place, and time. Cranial Nerves: No cranial nerve deficit. Coordination: Coordination normal.      Deep Tendon Reflexes: Reflexes normal.         CBC:   Lab Results   Component Value Date/Time    WBC 9.7 03/08/2021 09:39 PM    HGB 14.4 03/08/2021 09:39 PM    HCT 43.2 03/08/2021 09:39 PM     03/08/2021 09:39 PM     Lipids:  Lab Results   Component Value Date    CHOL 167 09/19/2020    TRIG 48 09/19/2020    HDL 71 09/19/2020    LDLDIRECT 86 09/19/2020     PT/INR: No results found for: INR     BMP:    Lab Results   Component Value Date     03/08/2021    K 3.5 03/08/2021     03/08/2021    CO2 22 03/08/2021    BUN 13 03/08/2021     CMP:   Lab Results   Component Value Date    AST 18 03/08/2021    PROT 7.4 03/08/2021    BILITOT 0.3 03/08/2021    ALKPHOS 59 03/08/2021     TSH:  No results found for: TSH, T4    EKGINTERPRETATION - EKG Interpretation:  Sinus rhythm      IMPRESSION / RECOMMENDATIONS:     Palpitations - sinus tachcardia ? inappropriate  Hypothyroid on levothyroxine  Non cardiac chest pain    Blood pressure is on the lower side today. Decrease the Cardizem to 180. Patient reports propranolol seems to have helped the most more than Cardizem. Uptitrate propranolol. Propranolol to 20 mg 3 times daily and decrease the Cardizem to 180 if blood pressure still low then will discontinue Cardizem and go up on propranolol. If blood pressure becomes an issue and symptoms exist then we can consider ivabradine. Discussed with the patient. Patient agrees with the plan and will follow in 4 months. Discussed with the patient avoid caffeine      Thanks again for allowing me to participate in care of this patient. Please call me if you have any questions. With best regards.       Yvonne Curiel MD, 8/17/2022 1:15 PM     Please note this report has been partially produced using speech recognition software and may contain errors related to that system including errors in grammar, punctuation, and spelling, as well as words and phrases that may be inappropriate. If there are any questions or concerns please feel free to contact the dictating provider for clarification.

## 2022-08-22 ENCOUNTER — TELEPHONE (OUTPATIENT)
Dept: FAMILY MEDICINE CLINIC | Age: 27
End: 2022-08-22

## 2022-09-13 RX ORDER — DILTIAZEM HYDROCHLORIDE 180 MG/1
180 CAPSULE, EXTENDED RELEASE ORAL DAILY
Qty: 30 CAPSULE | Refills: 5 | Status: SHIPPED | OUTPATIENT
Start: 2022-09-13

## 2022-09-13 RX ORDER — PROPRANOLOL HYDROCHLORIDE 20 MG/1
20 TABLET ORAL 3 TIMES DAILY
Qty: 90 TABLET | Refills: 5 | Status: SHIPPED | OUTPATIENT
Start: 2022-09-13

## 2022-09-14 RX ORDER — NORGESTIMATE AND ETHINYL ESTRADIOL 0.25-0.035
KIT ORAL
Qty: 84 TABLET | Refills: 3 | OUTPATIENT
Start: 2022-09-14

## 2022-09-19 RX ORDER — NORGESTIMATE AND ETHINYL ESTRADIOL 0.25-0.035
1 KIT ORAL DAILY
Qty: 1 PACKET | Refills: 1 | Status: SHIPPED | OUTPATIENT
Start: 2022-09-19 | End: 2022-10-26 | Stop reason: SDUPTHER

## 2022-10-18 RX ORDER — NORGESTIMATE AND ETHINYL ESTRADIOL 0.25-0.035
KIT ORAL
Qty: 28 TABLET | Refills: 1 | OUTPATIENT
Start: 2022-10-18

## 2022-10-26 ENCOUNTER — OFFICE VISIT (OUTPATIENT)
Dept: OBGYN | Age: 27
End: 2022-10-26
Payer: COMMERCIAL

## 2022-10-26 ENCOUNTER — HOSPITAL ENCOUNTER (OUTPATIENT)
Age: 27
Setting detail: SPECIMEN
Discharge: HOME | End: 2022-10-26
Payer: COMMERCIAL

## 2022-10-26 DIAGNOSIS — Z01.419 WOMEN'S ANNUAL ROUTINE GYNECOLOGICAL EXAMINATION: Primary | ICD-10-CM

## 2022-10-26 DIAGNOSIS — Z87.42 HISTORY OF DYSMENORRHEA: ICD-10-CM

## 2022-10-26 PROCEDURE — 99395 PREV VISIT EST AGE 18-39: CPT | Performed by: NURSE PRACTITIONER

## 2022-10-26 PROCEDURE — 87801 DETECT AGNT MULT DNA AMPLI: CPT

## 2022-10-26 PROCEDURE — 88142 CYTOPATH C/V THIN LAYER: CPT

## 2022-10-26 RX ORDER — NORGESTIMATE AND ETHINYL ESTRADIOL 0.25-0.035
1 KIT ORAL DAILY
Qty: 3 PACKET | Refills: 3 | Status: SHIPPED | OUTPATIENT
Start: 2022-10-26

## 2022-10-26 ASSESSMENT — ENCOUNTER SYMPTOMS
VOMITING: 0
CONSTIPATION: 0
NAUSEA: 0
ABDOMINAL PAIN: 0
SHORTNESS OF BREATH: 0
DIARRHEA: 0
GASTROINTESTINAL NEGATIVE: 1
RESPIRATORY NEGATIVE: 1

## 2022-10-29 LAB
CHLAMYDIA BY THIN PREP: NEGATIVE
GC BY THIN PREP: NEGATIVE

## 2022-11-15 ENCOUNTER — TELEPHONE (OUTPATIENT)
Dept: FAMILY MEDICINE CLINIC | Age: 27
End: 2022-11-15

## 2022-11-15 NOTE — TELEPHONE ENCOUNTER
[facility] call , pt states sore throat and pain in ears .  No appts available , pt advised to seek eval at [facility] , pt voiced understanding

## 2022-12-06 ENCOUNTER — TELEPHONE (OUTPATIENT)
Dept: CARDIOLOGY CLINIC | Age: 27
End: 2022-12-06

## 2022-12-07 ENCOUNTER — TELEPHONE (OUTPATIENT)
Dept: CARDIOLOGY CLINIC | Age: 27
End: 2022-12-07

## 2022-12-13 ENCOUNTER — TELEPHONE (OUTPATIENT)
Dept: CARDIOLOGY CLINIC | Age: 27
End: 2022-12-13

## 2023-01-04 ENCOUNTER — TELEPHONE (OUTPATIENT)
Dept: CARDIOLOGY CLINIC | Age: 28
End: 2023-01-04

## 2023-01-04 ENCOUNTER — OFFICE VISIT (OUTPATIENT)
Dept: CARDIOLOGY CLINIC | Age: 28
End: 2023-01-04
Payer: COMMERCIAL

## 2023-01-04 VITALS
WEIGHT: 122 LBS | HEIGHT: 64 IN | HEART RATE: 71 BPM | DIASTOLIC BLOOD PRESSURE: 60 MMHG | SYSTOLIC BLOOD PRESSURE: 106 MMHG | BODY MASS INDEX: 20.83 KG/M2

## 2023-01-04 DIAGNOSIS — R00.2 PALPITATIONS: Primary | ICD-10-CM

## 2023-01-04 PROCEDURE — 99213 OFFICE O/P EST LOW 20 MIN: CPT | Performed by: INTERNAL MEDICINE

## 2023-01-04 PROCEDURE — 93000 ELECTROCARDIOGRAM COMPLETE: CPT | Performed by: INTERNAL MEDICINE

## 2023-01-04 RX ORDER — DILTIAZEM HYDROCHLORIDE 180 MG/1
180 CAPSULE, EXTENDED RELEASE ORAL DAILY
Qty: 30 CAPSULE | Refills: 5 | Status: CANCELLED | OUTPATIENT
Start: 2023-01-04

## 2023-01-04 RX ORDER — PROPRANOLOL HYDROCHLORIDE 20 MG/1
30 TABLET ORAL 3 TIMES DAILY
Qty: 120 TABLET | Refills: 5 | Status: SHIPPED | OUTPATIENT
Start: 2023-01-04

## 2023-01-04 RX ORDER — PROPRANOLOL HYDROCHLORIDE 20 MG/1
20 TABLET ORAL 3 TIMES DAILY
Qty: 90 TABLET | Refills: 5 | Status: CANCELLED | OUTPATIENT
Start: 2023-01-04

## 2023-01-04 ASSESSMENT — ENCOUNTER SYMPTOMS
COLOR CHANGE: 0
VOMITING: 0
PHOTOPHOBIA: 0
CONSTIPATION: 0
COUGH: 0
BLOOD IN STOOL: 0
SHORTNESS OF BREATH: 0
WHEEZING: 0
BACK PAIN: 0
CHEST TIGHTNESS: 0
ABDOMINAL PAIN: 0
DIARRHEA: 0
NAUSEA: 0
EYE PAIN: 0

## 2023-01-04 NOTE — PROGRESS NOTES
Electrophysiology fu Note      Reason for consultation:  Palpitations, tachycardia    Chief complaint :  4 month follow for palpitations    Referring physician: Quin Lugo      Primary care physician: Narcisa Peters MD      History of Present Illness: This visit (1/4/2022)      Chief Complaint   Patient presents with    Follow-up     4 mo f/u   Pt states she has palpitations that feels like flutters occasionally but not often. She feels over all low better  No nicotine, alcohol or caffeine  Exercise occasionally     Palpitations           Previous visit: (8/17/2022)      Chief Complaint   Patient presents with    Follow-up     Pt denies other cardiac symptoms. Pt does not drink or smoke. Pt does not drink caffeine. Pt is getting some exercise. Palpitations     Random - flutters           Previous visit: (4/20/2022)      Chief Complaint   Patient presents with    Follow-up     3 mo Pt denies any new cardiac sx, no surgeries or procedures scheduled that she is aware of, she will need refills . Pt denies alcohol use and smoking. Patient has occasional chest pain which is sharp last few minutes. No chest pain today    Palpitations           Previous visit: (10/27/2021)      Chief Complaint   Patient presents with    Follow-up     4mth f/u . Pt has had chest pain. Pt has been have flutters here and there. Pt denies any other symptoms. Pt doesn't drink caffine. Pt doesn't drink alcohol or smoke. Pt does exercise 1x a week. Previous visit:    Patient is a 72-year-old female referred by Dr. Rainer Olsen. For palpitations and tachycardia. Patient reports this all started in January. Patient reports she felt her heart was racing she went to [facility] and she noted that she was in sinus tachycardia. Patient reports her heart rate went up to high as 180 and she reports that she was not doing much at all and her heart was racing.   She was told she had sinus tachycardia and Cardizem was started and then was referred to Dr. Sandra Diallo and Roger Barrera did the initial work-up and increased the Cardizem    Patient reports she has not had any episodes recently but she had bad episodes when she had to go to the hospital couple of times included the one in january    Patient denies any chest pain, shortness of breath, dizziness or syncope    Pastmedical history:   Past Medical History:   Diagnosis Date    Abnormal computed tomography angiography of heart 11/18/2021    Arterially enhancing lesions in the liver as above are likely hemangiomas, although other etiologies including vascular shunts could appear similar. Recommend further evaluation with liver protocol abdomen MRI    Abnormal EKG     Chest heaviness     Family history of coronary artery disease     Palpitations     Tachycardia     Thyroid disease     hypothyroid    Vaginal discharge        Surgical history : No recent surgeries    Family history:   Family History   Problem Relation Age of Onset    High Blood Pressure Mother     Heart Disease Maternal Grandmother     High Blood Pressure Maternal Grandmother     Heart Attack Maternal Grandmother     Hypertension Maternal Grandfather     Cancer Maternal Grandfather         LEUKEMIA    Diabetes Maternal Grandfather        Social history :  reports that she has never smoked. She has never used smokeless tobacco. She reports that she does not drink alcohol and does not use drugs.     No Known Allergies    Current Outpatient Medications on File Prior to Visit   Medication Sig Dispense Refill    norgestimate-ethinyl estradiol (ORTHO-CYCLEN, 28,) 0.25-35 MG-MCG per tablet Take 1 tablet by mouth daily 3 packet 3    propranolol (INDERAL) 20 MG tablet Take 1 tablet by mouth 3 times daily TAKE 1 TABLET BY MOUTH THREE TIMES A DAY 90 tablet 5    dilTIAZem (TIADYLT ER) 180 MG extended release capsule Take 1 capsule by mouth daily TAKE 1 CAPSULE BY MOUTH EVERY DAY 30 capsule 5    UNITHROID 112 MCG tablet TAKE 1 TABLET BY MOUTH EVERY DAY 90 tablet 1     No current facility-administered medications on file prior to visit. Review of Systems:   Review of Systems   Constitutional:  Negative for activity change, chills, fatigue and fever. HENT:  Negative for congestion, ear pain and tinnitus. Eyes:  Negative for photophobia, pain and visual disturbance. Respiratory:  Negative for cough, chest tightness, shortness of breath and wheezing. Cardiovascular:  Negative for chest pain, palpitations and leg swelling. Gastrointestinal:  Negative for abdominal pain, blood in stool, constipation, diarrhea, nausea and vomiting. Endocrine: Negative for cold intolerance and heat intolerance. Genitourinary:  Negative for dysuria, flank pain and hematuria. Musculoskeletal:  Negative for arthralgias, back pain, myalgias and neck stiffness. Skin:  Negative for color change and rash. Allergic/Immunologic: Negative for food allergies. Neurological:  Negative for dizziness, light-headedness, numbness and headaches. Hematological:  Does not bruise/bleed easily. Psychiatric/Behavioral:  Negative for agitation, behavioral problems and confusion. Examination:      Vitals:    01/04/23 1552   BP: 106/60   Site: Right Upper Arm   Position: Sitting   Cuff Size: Medium Adult   Pulse: 71   Weight: 122 lb (55.3 kg)   Height: 5' 4\" (1.626 m)        Body mass index is 20.94 kg/m². Physical Exam  HENT:      Head: Normocephalic and atraumatic. Eyes:      General:         Right eye: No discharge. Conjunctiva/sclera: Conjunctivae normal.      Pupils: Pupils are equal, round, and reactive to light. Neck:      Thyroid: No thyromegaly. Vascular: No JVD. Cardiovascular:      Rate and Rhythm: Normal rate and regular rhythm. Heart sounds: Normal heart sounds. No murmur heard. No friction rub. Pulmonary:      Effort: Pulmonary effort is normal. No respiratory distress.       Breath sounds: Normal breath sounds. No stridor. No wheezing. Abdominal:      General: Bowel sounds are normal. There is no distension. Palpations: Abdomen is soft. Tenderness: There is no abdominal tenderness. Musculoskeletal:         General: No tenderness. Normal range of motion. Cervical back: Normal range of motion. Skin:     General: Skin is warm and dry. Findings: No erythema or rash. Neurological:      Mental Status: She is alert and oriented to person, place, and time. Cranial Nerves: No cranial nerve deficit. Coordination: Coordination normal.      Deep Tendon Reflexes: Reflexes normal.         CBC:   Lab Results   Component Value Date/Time    WBC 9.7 03/08/2021 09:39 PM    HGB 14.4 03/08/2021 09:39 PM    HCT 43.2 03/08/2021 09:39 PM     03/08/2021 09:39 PM     Lipids:  Lab Results   Component Value Date    CHOL 167 09/19/2020    TRIG 48 09/19/2020    HDL 71 09/19/2020    LDLDIRECT 86 09/19/2020     PT/INR: No results found for: INR     BMP:    Lab Results   Component Value Date     03/08/2021    K 3.5 03/08/2021     03/08/2021    CO2 22 03/08/2021    BUN 13 03/08/2021     CMP:   Lab Results   Component Value Date    AST 18 03/08/2021    PROT 7.4 03/08/2021    BILITOT 0.3 03/08/2021    ALKPHOS 59 03/08/2021     TSH:  No results found for: TSH, T4    EKGINTERPRETATION - EKG Interpretation:  Sinus rhythm    Vitals:    01/04/23 1552   BP: 106/60   Site: Right Upper Arm   Position: Sitting   Cuff Size: Medium Adult   Pulse: 71   Weight: 122 lb (55.3 kg)   Height: 5' 4\" (1.626 m)        IMPRESSION / RECOMMENDATIONS:     Palpitations - sinus tachcardia ? inappropriate  Hypothyroid on levothyroxine      BP is softer today. She is feeling overall very well and little apprehensive to change. I think it would be better to come off cardizem especially with her lower BP and patient actually done better with propranolol.   So lets try with increase of propranolol to 30mg tid and stopping Cardizem. Plan is to eventually wean her off medications - patient has IST which may improve with age. Thanks again for allowing me to participate in care of this patient. Please call me if you have any questions. With best regards. Natalie Hughes MD, 1/4/2023 4:08 PM     Please note this report has been partially produced using speech recognition software and may contain errors related to that system including errors in grammar, punctuation, and spelling, as well as words and phrases that may be inappropriate. If there are any questions or concerns please feel free to contact the dictating provider for clarification.

## 2023-01-13 RX ORDER — DILTIAZEM HYDROCHLORIDE 180 MG/1
CAPSULE, EXTENDED RELEASE ORAL
Qty: 90 CAPSULE | Refills: 1 | OUTPATIENT
Start: 2023-01-13

## 2023-03-21 RX ORDER — PROPRANOLOL HYDROCHLORIDE 20 MG/1
TABLET ORAL
Qty: 270 TABLET | Refills: 1 | Status: SHIPPED | OUTPATIENT
Start: 2023-03-21

## 2023-08-03 RX ORDER — PROPRANOLOL HYDROCHLORIDE 20 MG/1
TABLET ORAL
Qty: 270 TABLET | Refills: 1 | Status: SHIPPED | OUTPATIENT
Start: 2023-08-03

## 2023-10-16 RX ORDER — NORGESTIMATE AND ETHINYL ESTRADIOL 0.25-0.035
1 KIT ORAL DAILY
Qty: 84 TABLET | Refills: 3 | OUTPATIENT
Start: 2023-10-16

## 2023-12-01 RX ORDER — NORGESTIMATE AND ETHINYL ESTRADIOL 0.25-0.035
1 KIT ORAL DAILY
Qty: 3 PACKET | Refills: 1 | Status: SHIPPED | OUTPATIENT
Start: 2023-12-01

## 2023-12-18 ENCOUNTER — TELEPHONE (OUTPATIENT)
Dept: CARDIOLOGY CLINIC | Age: 28
End: 2023-12-18

## 2024-01-17 ENCOUNTER — OFFICE VISIT (OUTPATIENT)
Dept: CARDIOLOGY CLINIC | Age: 29
End: 2024-01-17
Payer: COMMERCIAL

## 2024-01-17 VITALS
BODY MASS INDEX: 22.53 KG/M2 | HEIGHT: 64 IN | DIASTOLIC BLOOD PRESSURE: 70 MMHG | WEIGHT: 132 LBS | HEART RATE: 74 BPM | SYSTOLIC BLOOD PRESSURE: 106 MMHG

## 2024-01-17 DIAGNOSIS — I47.11 INAPPROPRIATE SINUS TACHYCARDIA: Primary | ICD-10-CM

## 2024-01-17 PROCEDURE — 99213 OFFICE O/P EST LOW 20 MIN: CPT | Performed by: INTERNAL MEDICINE

## 2024-01-17 NOTE — PROGRESS NOTES
Electrophysiology fu Note      Reason for consultation:  Palpitations, tachycardia    Chief complaint :   follow for palpitations    Referring physician:       Primary care physician: Colton Junior MD      History of Present Illness:     This visit (1/17/2024)      Chief Complaint   Patient presents with    Follow-up     States here for f/u denies any cardiac issues.   Patient feels very good. Denies chest pain, shortness of breath, palpitations, syncope or presyncope, edema   Tolerating medications           Previous visit:  (1/4/2022)      Chief Complaint   Patient presents with    Follow-up     4 mo f/u   Pt states she has palpitations that feels like flutters occasionally but not often. She feels over all low better  No nicotine, alcohol or caffeine  Exercise occasionally     Palpitations           Previous visit: (8/17/2022)      Chief Complaint   Patient presents with    Follow-up     Pt denies other cardiac symptoms.  Pt does not drink or smoke. Pt does not drink caffeine. Pt is getting some exercise.    Palpitations     Random - flutters           Previous visit: (4/20/2022)      Chief Complaint   Patient presents with    Follow-up     3 mo Pt denies any new cardiac sx, no surgeries or procedures scheduled that she is aware of, she will need refills . Pt denies alcohol use and smoking. Patient has occasional chest pain which is sharp last few minutes. No chest pain today    Palpitations           Previous visit: (10/27/2021)      Chief Complaint   Patient presents with    Follow-up     4mth f/u . Pt has had chest pain. Pt has been have flutters here and there. Pt denies any other symptoms. Pt doesn't drink caffine. Pt doesn't drink alcohol or smoke. Pt does exercise 1x a week.            Previous visit:    Patient is a 25-year-old female referred by Dr. Espinal. For palpitations and tachycardia.    Patient reports this all started in January.

## 2024-01-17 NOTE — PATIENT INSTRUCTIONS
Please be informed that if you contact our office outside of normal business hours the physician on call cannot help with any scheduling or rescheduling issues, procedure instruction questions or any type of medication issue.    We advise you for any urgent/emergency that you go to the nearest emergency room!    PLEASE CALL OUR OFFICE DURING NORMAL BUSINESS HOURS    Monday - Friday   8 am to 5 pm    [place]: [phone]    [place]: [phone]    [place]:  [phone]  Thank you for allowing us to care for you today!   We want to ensure we can follow your treatment plan and we strive to give you the best outcomes and experience possible.   If you ever have a life threatening emergency and call 911 - for an ambulance (EMS)   Our providers can only care for you at:   [facility] or [facility].   Even if you have someone take you or you drive yourself we can only care for you in a [facility] facility. Our providers are not setup at the other healthcare locations!   **It is YOUR responsibilty to bring medication bottles and/or updated medication list to EACH APPOINTMENT. This will allow us to better serve you and all your healthcare needs**

## 2024-03-20 ENCOUNTER — OFFICE VISIT (OUTPATIENT)
Dept: FAMILY MEDICINE CLINIC | Age: 29
End: 2024-03-20
Payer: COMMERCIAL

## 2024-03-20 VITALS
RESPIRATION RATE: 16 BRPM | DIASTOLIC BLOOD PRESSURE: 66 MMHG | HEIGHT: 64 IN | WEIGHT: 131.8 LBS | SYSTOLIC BLOOD PRESSURE: 104 MMHG | HEART RATE: 75 BPM | OXYGEN SATURATION: 98 % | BODY MASS INDEX: 22.5 KG/M2

## 2024-03-20 DIAGNOSIS — Z13.31 NEGATIVE DEPRESSION SCREENING: ICD-10-CM

## 2024-03-20 DIAGNOSIS — E03.9 ACQUIRED HYPOTHYROIDISM: ICD-10-CM

## 2024-03-20 DIAGNOSIS — Z00.00 ENCOUNTER FOR WELL ADULT EXAM WITHOUT ABNORMAL FINDINGS: Primary | ICD-10-CM

## 2024-03-20 PROCEDURE — 96127 BRIEF EMOTIONAL/BEHAV ASSMT: CPT | Performed by: STUDENT IN AN ORGANIZED HEALTH CARE EDUCATION/TRAINING PROGRAM

## 2024-03-20 PROCEDURE — 99395 PREV VISIT EST AGE 18-39: CPT | Performed by: STUDENT IN AN ORGANIZED HEALTH CARE EDUCATION/TRAINING PROGRAM

## 2024-03-20 ASSESSMENT — PATIENT HEALTH QUESTIONNAIRE - PHQ9
2. FEELING DOWN, DEPRESSED OR HOPELESS: NOT AT ALL
SUM OF ALL RESPONSES TO PHQ QUESTIONS 1-9: 0
SUM OF ALL RESPONSES TO PHQ9 QUESTIONS 1 & 2: 0
SUM OF ALL RESPONSES TO PHQ QUESTIONS 1-9: 0
1. LITTLE INTEREST OR PLEASURE IN DOING THINGS: NOT AT ALL

## 2024-03-20 ASSESSMENT — ENCOUNTER SYMPTOMS
DIARRHEA: 0
CONSTIPATION: 0
COUGH: 0
RHINORRHEA: 0
SHORTNESS OF BREATH: 0
WHEEZING: 0
SORE THROAT: 0

## 2024-03-20 NOTE — ASSESSMENT & PLAN NOTE
-rechecking TSH today  -Referring to a different endocrinologist for a second opinion per patient request

## 2024-03-20 NOTE — PROGRESS NOTES
Well Adult Note  Name: Milo Miramontes Today’s Date: 3/20/2024   MRN: [id number] Sex: Female   Age: 28 y.o. Ethnicity: Non- / Non    : 1995 Race: White (non-)      Milo Miramontes is here for well adult exam.  History:  Reports that she has been on thyriod medication since around 2017.  Followed by Dr. Reed right now, but requesting to have referral to another endocrinologist for a second opinion.     She currently has no complaints. She has regular periods that come every  28-30 days and last approximately 5-6 days. She is not currently sexually active.She uses OCPs for avoiding pregnancy.     Denies vaginal discharge or odor, itching/burning, spotting between periods, or excessive cramping. Denies dysuria or pain with intercourse.     Last pap =    Hx of abnormal Paps: no    Fhx of breast or ovarian cancer: no      Review of Systems   Constitutional:  Negative for activity change, appetite change and fever.   HENT:  Negative for congestion, rhinorrhea and sore throat.    Respiratory:  Negative for cough, shortness of breath and wheezing.    Gastrointestinal:  Negative for constipation and diarrhea.   Skin:  Negative for rash.   All other systems reviewed and are negative.      No Known Allergies      Prior to Visit Medications    Medication Sig Taking? Authorizing Provider   propranolol (INDERAL) 20 MG tablet TAKE 1 TABLET BY MOUTH 3 TIMES DAILY TAKE 1 TABLET BY MOUTH THREE TIMES A DAY Yes Anaya Redd APRN - CNP   norgestimate-ethinyl estradiol (ORTHO-CYCLEN, 28,) 0.25-35 MG-MCG per tablet Take 1 tablet by mouth daily Yes Esperanza Bolanos APRN - CNP   UNITHROID 112 MCG tablet TAKE 1 TABLET BY MOUTH EVERY DAY Yes David Shaw PA-C         Past Medical History:   Diagnosis Date    Abnormal computed tomography angiography of heart 2021    Arterially enhancing lesions in the liver as above are likely hemangiomas, although other etiologies including vascular shunts

## 2024-03-27 DIAGNOSIS — Z00.00 ENCOUNTER FOR WELL ADULT EXAM WITHOUT ABNORMAL FINDINGS: ICD-10-CM

## 2024-03-27 DIAGNOSIS — E03.9 ACQUIRED HYPOTHYROIDISM: ICD-10-CM

## 2024-03-28 LAB
ALBUMIN SERPL-MCNC: 4.6 G/DL (ref 3.4–5)
ALBUMIN/GLOB SERPL: 1.7 {RATIO} (ref 1.1–2.2)
ALP SERPL-CCNC: 53 U/L (ref 40–129)
ALT SERPL-CCNC: 11 U/L (ref 10–40)
ANION GAP SERPL CALCULATED.3IONS-SCNC: 12 MMOL/L (ref 3–16)
AST SERPL-CCNC: 15 U/L (ref 15–37)
BASOPHILS # BLD: 0 K/UL (ref 0–0.2)
BASOPHILS NFR BLD: 0.7 %
BILIRUB SERPL-MCNC: 0.5 MG/DL (ref 0–1)
BUN SERPL-MCNC: 8 MG/DL (ref 7–20)
CALCIUM SERPL-MCNC: 9.6 MG/DL (ref 8.3–10.6)
CHLORIDE SERPL-SCNC: 106 MMOL/L (ref 99–110)
CO2 SERPL-SCNC: 25 MMOL/L (ref 21–32)
CREAT SERPL-MCNC: 0.7 MG/DL (ref 0.6–1.1)
DEPRECATED RDW RBC AUTO: 12.3 % (ref 12.4–15.4)
EOSINOPHIL # BLD: 0.1 K/UL (ref 0–0.6)
EOSINOPHIL NFR BLD: 1.5 %
GFR SERPLBLD CREATININE-BSD FMLA CKD-EPI: >90 ML/MIN/{1.73_M2}
GLUCOSE SERPL-MCNC: 74 MG/DL (ref 70–99)
HCT VFR BLD AUTO: 40.7 % (ref 36–48)
HGB BLD-MCNC: 14 G/DL (ref 12–16)
LYMPHOCYTES # BLD: 2.6 K/UL (ref 1–5.1)
LYMPHOCYTES NFR BLD: 40.9 %
MCH RBC QN AUTO: 32 PG (ref 26–34)
MCHC RBC AUTO-ENTMCNC: 34.5 G/DL (ref 31–36)
MCV RBC AUTO: 92.6 FL (ref 80–100)
MONOCYTES # BLD: 0.4 K/UL (ref 0–1.3)
MONOCYTES NFR BLD: 6.5 %
NEUTROPHILS # BLD: 3.2 K/UL (ref 1.7–7.7)
NEUTROPHILS NFR BLD: 50.4 %
PLATELET # BLD AUTO: 189 K/UL (ref 135–450)
PMV BLD AUTO: 11.6 FL (ref 5–10.5)
POTASSIUM SERPL-SCNC: 4.4 MMOL/L (ref 3.5–5.1)
PROT SERPL-MCNC: 7.3 G/DL (ref 6.4–8.2)
RBC # BLD AUTO: 4.39 M/UL (ref 4–5.2)
SODIUM SERPL-SCNC: 143 MMOL/L (ref 136–145)
TSH SERPL DL<=0.005 MIU/L-ACNC: 2.85 UIU/ML (ref 0.27–4.2)
WBC # BLD AUTO: 6.4 K/UL (ref 4–11)

## 2024-04-22 RX ORDER — PROPRANOLOL HYDROCHLORIDE 20 MG/1
TABLET ORAL
Qty: 270 TABLET | Refills: 1 | OUTPATIENT
Start: 2024-04-22

## 2024-04-22 RX ORDER — PROPRANOLOL HYDROCHLORIDE 20 MG/1
TABLET ORAL
Qty: 270 TABLET | Refills: 1 | Status: SHIPPED | OUTPATIENT
Start: 2024-04-22

## 2024-04-22 NOTE — TELEPHONE ENCOUNTER
Refill/90 day/ New script may need done.  Patient is taking 1 1/2 tablet three times daily. Patient is out of medication.

## 2024-05-13 ENCOUNTER — OFFICE VISIT (OUTPATIENT)
Dept: FAMILY MEDICINE CLINIC | Age: 29
End: 2024-05-13

## 2024-05-13 ENCOUNTER — TELEPHONE (OUTPATIENT)
Dept: CARDIOLOGY CLINIC | Age: 29
End: 2024-05-13

## 2024-05-13 VITALS
BODY MASS INDEX: 22.14 KG/M2 | OXYGEN SATURATION: 98 % | TEMPERATURE: 98.3 F | WEIGHT: 129 LBS | DIASTOLIC BLOOD PRESSURE: 70 MMHG | HEART RATE: 95 BPM | SYSTOLIC BLOOD PRESSURE: 106 MMHG

## 2024-05-13 DIAGNOSIS — N30.00 ACUTE CYSTITIS WITHOUT HEMATURIA: Primary | ICD-10-CM

## 2024-05-13 DIAGNOSIS — R30.0 DYSURIA: ICD-10-CM

## 2024-05-13 LAB
BILIRUBIN, POC: NEGATIVE
BLOOD URINE, POC: NEGATIVE
CLARITY, POC: CLEAR
COLOR, POC: YELLOW
GLUCOSE URINE, POC: NEGATIVE
KETONES, POC: NEGATIVE
LEUKOCYTE EST, POC: ABNORMAL
NITRITE, POC: NEGATIVE
PH, POC: 6
PROTEIN, POC: NEGATIVE
SPECIFIC GRAVITY, POC: 1.02
UROBILINOGEN, POC: ABNORMAL

## 2024-05-13 RX ORDER — CEPHALEXIN 500 MG/1
CAPSULE ORAL
COMMUNITY
Start: 2024-05-10 | End: 2024-05-13 | Stop reason: ALTCHOICE

## 2024-05-13 RX ORDER — LEVOTHYROXINE SODIUM 0.12 MG/1
TABLET ORAL
COMMUNITY
Start: 2024-03-26 | End: 2024-05-13

## 2024-05-13 RX ORDER — PHENAZOPYRIDINE HYDROCHLORIDE 200 MG/1
200 TABLET, FILM COATED ORAL 3 TIMES DAILY PRN
Qty: 9 TABLET | Refills: 0 | Status: SHIPPED | OUTPATIENT
Start: 2024-05-13 | End: 2024-05-16

## 2024-05-13 RX ORDER — PROPRANOLOL HYDROCHLORIDE 20 MG/1
TABLET ORAL
Qty: 270 TABLET | Refills: 0 | Status: SHIPPED | OUTPATIENT
Start: 2024-05-13

## 2024-05-13 RX ORDER — SULFAMETHOXAZOLE AND TRIMETHOPRIM 800; 160 MG/1; MG/1
1 TABLET ORAL 2 TIMES DAILY
Qty: 14 TABLET | Refills: 0 | Status: SHIPPED | OUTPATIENT
Start: 2024-05-13 | End: 2024-05-20

## 2024-05-13 ASSESSMENT — ENCOUNTER SYMPTOMS
RESPIRATORY NEGATIVE: 1
NAUSEA: 1
ABDOMINAL PAIN: 1

## 2024-05-13 NOTE — PROGRESS NOTES
Subjective   Patient ID: Milo Miramontes is a 28 y.o. female.    \"I feel like I started having a UTI and now it's in my bladder. \"  Hurts when urinating.   Started with frequency and burning. 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 Has bene taking but is not better.   Now having pain in kidneys.  No fever. No NVD        Urinary Tract Infection  This is a new problem. The current episode started 1 to 4 weeks ago. Associated symptoms include pain. The pain is present in the bladder.       Review of Systems   Constitutional:  Negative for chills and fever.   Respiratory: Negative.     Cardiovascular: Negative.    Gastrointestinal:  Positive for abdominal pain and nausea.   Genitourinary:  Positive for dysuria, frequency and urgency.   Neurological:  Negative for headaches.          Objective   Physical Exam  Vitals reviewed.   Constitutional:       Appearance: Normal appearance. She is not ill-appearing.   Cardiovascular:      Rate and Rhythm: Normal rate and regular rhythm.   Pulmonary:      Effort: Pulmonary effort is normal. No respiratory distress.   Abdominal:      General: There is no distension.      Palpations: Abdomen is soft.      Tenderness: There is no abdominal tenderness. There is no guarding.   Skin:     General: Skin is warm and dry.   Neurological:      Mental Status: She is alert and oriented to person, place, and time.   Psychiatric:         Thought Content: Thought content normal.         Judgment: Judgment normal.            Assessment    Diagnosis Orders   1. Acute cystitis without hematuria  sulfamethoxazole-trimethoprim (BACTRIM DS;SEPTRA DS) 800-160 MG per tablet    phenazopyridine (PYRIDIUM) 200 MG tablet      2. Dysuria  POCT Urinalysis no Micro    Culture, Urine        Urine dipstick shows positive for leukocytes.  Micro exam: not done.        Plan   1. Dysuria  Stop taking keflex.     - POCT Urinalysis no Micro  - Culture, Urine    2. Acute cystitis without

## 2024-05-13 NOTE — TELEPHONE ENCOUNTER
Propranolol- Patient said on last visit dr wanted  patient to take 1/2 tablet, 3 times daily.  This was never changed at her pharmacy.  Patient needs a new script sent in order for them to fill it.

## 2024-05-13 NOTE — TELEPHONE ENCOUNTER
Called patient back, after speaking with Anaya chinchilla for patient to take 1 tablet 3 x day per ANDRE Redd CNP patient advised.

## 2024-05-13 NOTE — TELEPHONE ENCOUNTER
Patient called and advised that per Dr Arguello's note on 1/17/2024;  May consider decreasing propranolol to 10mg tid next visit to keep her on lowest dose possible and see if she is improving with her IST     Patient stated she was told by Dr Arguello to take 1 1/2 tablets 3x day and so she is running out of her rx and pharmacy wont refill. Advised patient I do not see any notes and that will ask and call patient back.

## 2024-05-14 LAB — BACTERIA UR CULT: NORMAL

## 2024-08-19 ENCOUNTER — TELEPHONE (OUTPATIENT)
Dept: CARDIOLOGY CLINIC | Age: 29
End: 2024-08-19

## 2024-08-19 RX ORDER — NORGESTIMATE AND ETHINYL ESTRADIOL 0.25-0.035
1 KIT ORAL DAILY
Qty: 3 PACKET | Refills: 0 | Status: SHIPPED | OUTPATIENT
Start: 2024-08-19

## 2024-08-20 NOTE — TELEPHONE ENCOUNTER
Called patient and f/u with Dr Arguello scheduled for 9/11/2024 @ 4pm. Patient only wants to see Dr. Arguello and only on a Wednesday.

## 2024-09-11 ENCOUNTER — OFFICE VISIT (OUTPATIENT)
Dept: CARDIOLOGY CLINIC | Age: 29
End: 2024-09-11
Payer: COMMERCIAL

## 2024-09-11 VITALS
WEIGHT: 123.4 LBS | HEART RATE: 83 BPM | SYSTOLIC BLOOD PRESSURE: 102 MMHG | DIASTOLIC BLOOD PRESSURE: 68 MMHG | BODY MASS INDEX: 21.07 KG/M2 | HEIGHT: 64 IN | OXYGEN SATURATION: 98 %

## 2024-09-11 DIAGNOSIS — R00.0 TACHYCARDIA: Primary | ICD-10-CM

## 2024-09-11 PROCEDURE — 99213 OFFICE O/P EST LOW 20 MIN: CPT | Performed by: INTERNAL MEDICINE

## 2024-09-11 PROCEDURE — 93000 ELECTROCARDIOGRAM COMPLETE: CPT | Performed by: INTERNAL MEDICINE

## 2024-09-11 RX ORDER — PROPRANOLOL HCL 10 MG
10 TABLET ORAL 3 TIMES DAILY
Qty: 270 TABLET | Refills: 1 | Status: SHIPPED | OUTPATIENT
Start: 2024-09-11

## 2024-09-11 NOTE — PATIENT INSTRUCTIONS
**It is YOUR responsibilty to bring medication bottles and/or updated medication list to EACH APPOINTMENT. This will allow us to better serve you and all your healthcare needs**  Thank you for allowing us to care for you today!   We want to ensure we can follow your treatment plan and we strive to give you the best outcomes and experience possible.   If you ever have a life threatening emergency and call 911 - for an ambulance (EMS)   Our providers can only care for you at:   [facility] or [facility].   Even if you have someone take you or you drive yourself we can only care for you in a [facility]. Our providers are not setup at the other healthcare locations!   Please be informed that if you contact our office outside of normal business hours the physician on call cannot help with any scheduling or rescheduling issues, procedure instruction questions or any type of medication issue.    We advise you for any urgent/emergency that you go to the nearest emergency room!    PLEASE CALL OUR OFFICE DURING NORMAL BUSINESS HOURS    Monday - Friday   8 am to 5 pm    [place]: [phone]    [place]: [phone]    [employer]:  [phone]  We are committed to providing you the best care possible.    If you receive a survey after visiting one of our offices, please take time to share your experience concerning your physician office visit.  These surveys are confidential and no health information about you is shared.    We are eager to improve for you and we are counting on your feedback to help make that happen.

## 2024-09-11 NOTE — PROGRESS NOTES
Weight: 56 kg (123 lb 6.4 oz)   Height: 1.626 m (5' 4\")        IMPRESSION / RECOMMENDATIONS:     Palpitations - sinus tachcardia ? inappropriate  Hypothyroid on levothyroxine      Patient doing well  Decrease propranolol 10mg tid  Patient apprehensive but she also wants to come off medication  If she tolerates well then we can probably discontinue in 3 months  If she has symptoms then she goes back on 20mg    Patient voiced understanding.  FU in 3 months      Thanks again for allowing me to participate in care of this patient. Please call me if you have any questions.    With best regards.      Lawrence Arguello MD, 9/11/2024 4:18 PM     Please note this report has been partially produced using speech recognition software and may contain errors related to that system including errors in grammar, punctuation, and spelling, as well as words and phrases that may be inappropriate. If there are any questions or concerns please feel free to contact the dictating provider for clarification.

## 2024-10-02 RX ORDER — LEVOTHYROXINE SODIUM 112 UG/1
112 TABLET ORAL DAILY
Qty: 90 TABLET | Refills: 1 | Status: SHIPPED | OUTPATIENT
Start: 2024-10-02

## 2024-11-13 RX ORDER — NORGESTIMATE AND ETHINYL ESTRADIOL 0.25-0.035
1 KIT ORAL DAILY
Qty: 3 PACKET | Refills: 0 | Status: SHIPPED | OUTPATIENT
Start: 2024-11-13

## 2025-01-29 RX ORDER — NORGESTIMATE AND ETHINYL ESTRADIOL 0.25-0.035
1 KIT ORAL DAILY
Qty: 3 PACKET | Refills: 0 | Status: CANCELLED | OUTPATIENT
Start: 2025-01-29

## 2025-02-05 ENCOUNTER — OFFICE VISIT (OUTPATIENT)
Dept: OBGYN | Age: 30
End: 2025-02-05
Payer: COMMERCIAL

## 2025-02-05 ENCOUNTER — HOSPITAL ENCOUNTER (OUTPATIENT)
Age: 30
Setting detail: SPECIMEN
Discharge: HOME | End: 2025-02-05
Payer: COMMERCIAL

## 2025-02-05 VITALS
SYSTOLIC BLOOD PRESSURE: 130 MMHG | BODY MASS INDEX: 20.83 KG/M2 | HEIGHT: 64 IN | DIASTOLIC BLOOD PRESSURE: 92 MMHG | WEIGHT: 122 LBS | HEART RATE: 98 BPM

## 2025-02-05 DIAGNOSIS — R35.0 URINARY FREQUENCY: ICD-10-CM

## 2025-02-05 DIAGNOSIS — Z01.419 ENCOUNTER FOR ANNUAL ROUTINE GYNECOLOGICAL EXAMINATION: Primary | ICD-10-CM

## 2025-02-05 DIAGNOSIS — N92.6 IRREGULAR MENSES: ICD-10-CM

## 2025-02-05 DIAGNOSIS — N93.0 POSTCOITAL BLEEDING: ICD-10-CM

## 2025-02-05 LAB
BILIRUBIN, POC: NORMAL
BLOOD URINE, POC: NORMAL
CLARITY, POC: NORMAL
COLOR, POC: NORMAL
GLUCOSE URINE, POC: NORMAL MG/DL
KETONES, POC: NORMAL MG/DL
LEUKOCYTE EST, POC: NORMAL
NITRITE, POC: NORMAL
PH, POC: NORMAL
PROTEIN, POC: NORMAL MG/DL
SPECIFIC GRAVITY, POC: NORMAL
UROBILINOGEN, POC: NORMAL MG/DL

## 2025-02-05 PROCEDURE — 81002 URINALYSIS NONAUTO W/O SCOPE: CPT

## 2025-02-05 PROCEDURE — 99395 PREV VISIT EST AGE 18-39: CPT

## 2025-02-05 PROCEDURE — 87591 N.GONORRHOEAE DNA AMP PROB: CPT

## 2025-02-05 PROCEDURE — 99459 PELVIC EXAMINATION: CPT

## 2025-02-05 PROCEDURE — 87491 CHLMYD TRACH DNA AMP PROBE: CPT

## 2025-02-05 PROCEDURE — G0123 SCREEN CERV/VAG THIN LAYER: HCPCS

## 2025-02-05 RX ORDER — DROSPIRENONE AND ETHINYL ESTRADIOL 0.02-3(28)
1 KIT ORAL DAILY
Qty: 3 PACKET | Refills: 3 | Status: SHIPPED | OUTPATIENT
Start: 2025-02-05

## 2025-02-05 SDOH — ECONOMIC STABILITY: FOOD INSECURITY: WITHIN THE PAST 12 MONTHS, YOU WORRIED THAT YOUR FOOD WOULD RUN OUT BEFORE YOU GOT MONEY TO BUY MORE.: NEVER TRUE

## 2025-02-05 SDOH — ECONOMIC STABILITY: FOOD INSECURITY: WITHIN THE PAST 12 MONTHS, THE FOOD YOU BOUGHT JUST DIDN'T LAST AND YOU DIDN'T HAVE MONEY TO GET MORE.: NEVER TRUE

## 2025-02-05 ASSESSMENT — PATIENT HEALTH QUESTIONNAIRE - PHQ9
SUM OF ALL RESPONSES TO PHQ QUESTIONS 1-9: 0
2. FEELING DOWN, DEPRESSED OR HOPELESS: NOT AT ALL
1. LITTLE INTEREST OR PLEASURE IN DOING THINGS: NOT AT ALL
SUM OF ALL RESPONSES TO PHQ9 QUESTIONS 1 & 2: 0

## 2025-02-05 NOTE — PROGRESS NOTES
25    Milo Salazarnyroyal  1995    Chief Complaint   Patient presents with    Annual Exam     Annual exam. Non-smoker No known h/o dvt. LMP 2025..Periods are irregular. Patient is sexually active. Patient is on oral contraception. Pap Smear was 10/26/2022 and results were negative, HPV negative. Patient advised she needs a refill for b/c ortho-cyclen.    Urinary Frequency     Pt c/o frequent urination, irregular menses and postcoital bleeding. Pt denies any pain, discharge or odor.           The patient is a 29 y.o. female,  who presents for her annual exam.  She is menstruating abnormally reports irregular cycles/breakthrough bleeding the past 2 months. She has also noticed postcoital bleeding over the past 2 months.  She is sexually active. She is currently taking birth control.  Birth control method is oral contraceptive    Also c/o frequent urination, concerned for UTI.    Pap smear history: Her last PAP smear was in .  Her results were normal.    Past Medical History:   Diagnosis Date    Abnormal computed tomography angiography of heart 2021    Arterially enhancing lesions in the liver as above are likely hemangiomas, although other etiologies including vascular shunts could appear similar. Recommend further evaluation with liver protocol abdomen MRI    Abnormal EKG     Chest heaviness     Family history of coronary artery disease     Palpitations     Tachycardia     Thyroid disease     hypothyroid    Vaginal discharge        No past surgical history on file.    Family History   Problem Relation Age of Onset    High Blood Pressure Mother     Heart Disease Maternal Grandmother     High Blood Pressure Maternal Grandmother     Heart Attack Maternal Grandmother     Hypertension Maternal Grandfather     Cancer Maternal Grandfather         LEUKEMIA    Diabetes Maternal Grandfather        Social History     Tobacco Use    Smoking status: Never    Smokeless tobacco: Never   Vaping Use

## 2025-02-06 LAB
BACTERIA URNS QL MICRO: NORMAL /HPF
BILIRUB UR QL STRIP.AUTO: NEGATIVE
CLARITY UR: CLEAR
COLOR UR: YELLOW
EPI CELLS #/AREA URNS AUTO: 2 /HPF (ref 0–5)
GLUCOSE UR STRIP.AUTO-MCNC: NEGATIVE MG/DL
HGB UR QL STRIP.AUTO: NEGATIVE
HYALINE CASTS #/AREA URNS AUTO: 0 /LPF (ref 0–8)
KETONES UR STRIP.AUTO-MCNC: NEGATIVE MG/DL
LEUKOCYTE ESTERASE UR QL STRIP.AUTO: ABNORMAL
NITRITE UR QL STRIP.AUTO: NEGATIVE
PH UR STRIP.AUTO: 6.5 [PH] (ref 5–8)
PROT UR STRIP.AUTO-MCNC: NEGATIVE MG/DL
RBC CLUMPS #/AREA URNS AUTO: 0 /HPF (ref 0–4)
SP GR UR STRIP.AUTO: 1.01 (ref 1–1.03)
SPECIMEN TYPE: NORMAL
TRICHOMONAS VAGINALIS SCREEN: NEGATIVE
UA DIPSTICK W REFLEX MICRO PNL UR: YES
URN SPEC COLLECT METH UR: ABNORMAL
UROBILINOGEN UR STRIP-ACNC: 0.2 E.U./DL
WBC #/AREA URNS AUTO: 0 /HPF (ref 0–5)

## 2025-02-06 ASSESSMENT — ENCOUNTER SYMPTOMS
DIARRHEA: 0
GASTROINTESTINAL NEGATIVE: 1
ABDOMINAL PAIN: 0
CHEST TIGHTNESS: 0
VOMITING: 0
CONSTIPATION: 0
SHORTNESS OF BREATH: 0
RESPIRATORY NEGATIVE: 1
NAUSEA: 0

## 2025-02-07 LAB
BACTERIA UR CULT: NORMAL
C TRACH SPEC QL CULT: POSITIVE
NEISSERIA GONORRHOEAE, CULTURE: NEGATIVE

## 2025-02-08 RX ORDER — DOXYCYCLINE HYCLATE 100 MG
100 TABLET ORAL 2 TIMES DAILY
Qty: 14 TABLET | Refills: 0 | Status: SHIPPED | OUTPATIENT
Start: 2025-02-08

## 2025-02-10 LAB — GYNECOLOGY CYTOLOGY REPORT: NORMAL

## 2025-02-19 ENCOUNTER — TELEPHONE (OUTPATIENT)
Dept: CARDIOLOGY CLINIC | Age: 30
End: 2025-02-19

## 2025-02-25 RX ORDER — PROPRANOLOL HYDROCHLORIDE 10 MG/1
10 TABLET ORAL 3 TIMES DAILY
Qty: 90 TABLET | Refills: 0 | Status: SHIPPED | OUTPATIENT
Start: 2025-02-25

## 2025-03-12 ENCOUNTER — OFFICE VISIT (OUTPATIENT)
Dept: FAMILY MEDICINE CLINIC | Age: 30
End: 2025-03-12

## 2025-03-12 VITALS
HEART RATE: 80 BPM | HEIGHT: 64 IN | OXYGEN SATURATION: 100 % | DIASTOLIC BLOOD PRESSURE: 64 MMHG | WEIGHT: 121 LBS | BODY MASS INDEX: 20.66 KG/M2 | SYSTOLIC BLOOD PRESSURE: 102 MMHG

## 2025-03-12 DIAGNOSIS — R14.0 BLOATING: ICD-10-CM

## 2025-03-12 DIAGNOSIS — Z13.220 SCREENING CHOLESTEROL LEVEL: ICD-10-CM

## 2025-03-12 DIAGNOSIS — E03.9 ACQUIRED HYPOTHYROIDISM: Primary | ICD-10-CM

## 2025-03-12 DIAGNOSIS — E03.9 ACQUIRED HYPOTHYROIDISM: ICD-10-CM

## 2025-03-12 RX ORDER — LEVOTHYROXINE SODIUM 112 UG/1
112 TABLET ORAL DAILY
Qty: 90 TABLET | Refills: 1 | Status: SHIPPED | OUTPATIENT
Start: 2025-03-12

## 2025-03-12 NOTE — PROGRESS NOTES
Subjective     Patient ID: Milo is a 29 y.o. female who presents for Thyroid Problem (Thyroid needs checked, its been a year. ) and Other (Having a lot of bloating, would like to be tested for gluten allergy ).     Patient was previously seen by Dr. Reed to manage her hypothyroidism, but patient asked to switch to a different endocrinologist for second opinion at last visit.  Referral was placed, but she was not able to make it to her first appointment and had to reschedule.  She is wondering if PCP can take over her levothyroxine medication.    She is also wondering if she could have a gluten intolerance.  Notes that she gets very bloated with some cramping throughout the day that seems to happen after eating.  Has not tried eliminating gluten from diet yet to see if symptoms resolve.  Denies any diarrhea, constipation, nausea, vomiting, or blood in stool.  Has never been tested for celiac disease before.         Review of Systems   Constitutional:  Negative for activity change, appetite change and fever.   HENT:  Negative for congestion, rhinorrhea and sore throat.    Respiratory:  Negative for cough, shortness of breath and wheezing.    Gastrointestinal:  Positive for abdominal distention. Negative for abdominal pain, blood in stool, constipation, diarrhea, nausea and vomiting.   Skin:  Negative for rash.   All other systems reviewed and are negative.       Objective   Vitals:    03/12/25 1132   BP: 102/64   Pulse: 80   SpO2: 100%       Physical Exam  Vitals and nursing note reviewed.   Constitutional:       General: She is not in acute distress.     Appearance: Normal appearance. She is not ill-appearing, toxic-appearing or diaphoretic.   HENT:      Head: Normocephalic and atraumatic.      Nose: Nose normal.      Mouth/Throat:      Mouth: Mucous membranes are moist.      Pharynx: Oropharynx is clear.   Eyes:      Extraocular Movements: Extraocular movements intact.      Conjunctiva/sclera: Conjunctivae

## 2025-03-13 LAB
ALBUMIN SERPL-MCNC: 4.2 G/DL (ref 3.4–5)
ALBUMIN/GLOB SERPL: 1.6 {RATIO} (ref 1.1–2.2)
ALP SERPL-CCNC: 57 U/L (ref 40–129)
ALT SERPL-CCNC: 22 U/L (ref 10–40)
ANION GAP SERPL CALCULATED.3IONS-SCNC: 10 MMOL/L (ref 3–16)
AST SERPL-CCNC: 25 U/L (ref 15–37)
BASOPHILS # BLD: 0 K/UL (ref 0–0.2)
BASOPHILS NFR BLD: 0.5 %
BILIRUB SERPL-MCNC: 0.3 MG/DL (ref 0–1)
BUN SERPL-MCNC: 8 MG/DL (ref 7–20)
CALCIUM SERPL-MCNC: 9.5 MG/DL (ref 8.3–10.6)
CHLORIDE SERPL-SCNC: 104 MMOL/L (ref 99–110)
CHOLEST SERPL-MCNC: 171 MG/DL (ref 0–199)
CO2 SERPL-SCNC: 27 MMOL/L (ref 21–32)
CREAT SERPL-MCNC: 0.7 MG/DL (ref 0.6–1.1)
DEPRECATED RDW RBC AUTO: 12 % (ref 12.4–15.4)
EOSINOPHIL # BLD: 0.1 K/UL (ref 0–0.6)
EOSINOPHIL NFR BLD: 1.9 %
GFR SERPLBLD CREATININE-BSD FMLA CKD-EPI: >90 ML/MIN/{1.73_M2}
GLUCOSE SERPL-MCNC: 81 MG/DL (ref 70–99)
HCT VFR BLD AUTO: 41.9 % (ref 36–48)
HDLC SERPL-MCNC: 58 MG/DL (ref 40–60)
HGB BLD-MCNC: 13.8 G/DL (ref 12–16)
LDLC SERPL CALC-MCNC: 92 MG/DL
LYMPHOCYTES # BLD: 2.6 K/UL (ref 1–5.1)
LYMPHOCYTES NFR BLD: 34.2 %
MCH RBC QN AUTO: 31.1 PG (ref 26–34)
MCHC RBC AUTO-ENTMCNC: 33 G/DL (ref 31–36)
MCV RBC AUTO: 94.2 FL (ref 80–100)
MONOCYTES # BLD: 0.5 K/UL (ref 0–1.3)
MONOCYTES NFR BLD: 6.7 %
NEUTROPHILS # BLD: 4.3 K/UL (ref 1.7–7.7)
NEUTROPHILS NFR BLD: 56.7 %
PLATELET # BLD AUTO: 186 K/UL (ref 135–450)
PMV BLD AUTO: 11.8 FL (ref 5–10.5)
POTASSIUM SERPL-SCNC: 4.4 MMOL/L (ref 3.5–5.1)
PROT SERPL-MCNC: 6.8 G/DL (ref 6.4–8.2)
RBC # BLD AUTO: 4.45 M/UL (ref 4–5.2)
SODIUM SERPL-SCNC: 141 MMOL/L (ref 136–145)
TISSUE TRANSGLUTAMINASE ANTIBODY IGG: <0.8 U/ML (ref 0–14)
TISSUE TRANSGLUTAMINASE IGA: <0.5 U/ML (ref 0–14)
TRIGL SERPL-MCNC: 103 MG/DL (ref 0–150)
TSH SERPL DL<=0.005 MIU/L-ACNC: 0.35 UIU/ML (ref 0.27–4.2)
VLDLC SERPL CALC-MCNC: 21 MG/DL
WBC # BLD AUTO: 7.7 K/UL (ref 4–11)

## 2025-03-14 ENCOUNTER — RESULTS FOLLOW-UP (OUTPATIENT)
Dept: FAMILY MEDICINE CLINIC | Age: 30
End: 2025-03-14

## 2025-03-14 LAB — ENDOMYSIUM IGA TITR SER IF: NORMAL {TITER}

## 2025-03-15 ASSESSMENT — ENCOUNTER SYMPTOMS
DIARRHEA: 0
CONSTIPATION: 0
ABDOMINAL DISTENTION: 1
SHORTNESS OF BREATH: 0
COUGH: 0
ABDOMINAL PAIN: 0
RHINORRHEA: 0
BLOOD IN STOOL: 0
SORE THROAT: 0
VOMITING: 0
NAUSEA: 0
WHEEZING: 0

## 2025-03-15 NOTE — ASSESSMENT & PLAN NOTE
-rechecking TSH today  -continue levothyroxine 112mcg for now and can adjust dose as necessary after labs result.

## 2025-03-19 ENCOUNTER — TELEPHONE (OUTPATIENT)
Dept: OBGYN | Age: 30
End: 2025-03-19

## 2025-03-25 DIAGNOSIS — E03.9 ACQUIRED HYPOTHYROIDISM: ICD-10-CM

## 2025-03-25 RX ORDER — LEVOTHYROXINE SODIUM 112 UG/1
112 TABLET ORAL DAILY
Qty: 90 TABLET | Refills: 1 | Status: SHIPPED | OUTPATIENT
Start: 2025-03-25

## 2025-04-25 ENCOUNTER — TELEPHONE (OUTPATIENT)
Dept: CARDIOLOGY CLINIC | Age: 30
End: 2025-04-25

## 2025-04-25 RX ORDER — PROPRANOLOL HYDROCHLORIDE 10 MG/1
10 TABLET ORAL 3 TIMES DAILY
Qty: 90 TABLET | Refills: 0 | OUTPATIENT
Start: 2025-04-25

## 2025-04-25 RX ORDER — PROPRANOLOL HYDROCHLORIDE 10 MG/1
10 TABLET ORAL 3 TIMES DAILY
Qty: 90 TABLET | Refills: 0 | Status: CANCELLED | OUTPATIENT
Start: 2025-04-25

## 2025-04-25 RX ORDER — PROPRANOLOL HYDROCHLORIDE 10 MG/1
10 TABLET ORAL 3 TIMES DAILY
Qty: 90 TABLET | Refills: 0 | Status: SHIPPED | OUTPATIENT
Start: 2025-04-25

## 2025-04-25 NOTE — TELEPHONE ENCOUNTER
Spoke with Patient and informed her that a RX refill was sent and she needs to keep her appointment with Anaya Redd NP on 5/21/25, per Vicente Branch RN.    Patient advised understanding.

## 2025-05-21 ENCOUNTER — TELEPHONE (OUTPATIENT)
Dept: CARDIOLOGY CLINIC | Age: 30
End: 2025-05-21

## 2025-05-22 RX ORDER — PROPRANOLOL HYDROCHLORIDE 10 MG/1
10 TABLET ORAL 3 TIMES DAILY
Qty: 90 TABLET | Refills: 0 | Status: SHIPPED | OUTPATIENT
Start: 2025-05-22

## 2025-05-22 NOTE — TELEPHONE ENCOUNTER
Spoke with patient on 5/21/25 and scheduled follow up with Dr. Arguello to 7/9/25 @ 115pm.    Patient advised understanding.

## 2025-06-30 RX ORDER — PROPRANOLOL HYDROCHLORIDE 10 MG/1
10 TABLET ORAL 3 TIMES DAILY
Qty: 90 TABLET | Refills: 0 | Status: SHIPPED | OUTPATIENT
Start: 2025-06-30

## 2025-07-09 ENCOUNTER — OFFICE VISIT (OUTPATIENT)
Age: 30
End: 2025-07-09
Payer: COMMERCIAL

## 2025-07-09 VITALS
WEIGHT: 123.6 LBS | BODY MASS INDEX: 21.1 KG/M2 | HEIGHT: 64 IN | HEART RATE: 81 BPM | DIASTOLIC BLOOD PRESSURE: 62 MMHG | SYSTOLIC BLOOD PRESSURE: 104 MMHG

## 2025-07-09 DIAGNOSIS — I47.11 INAPPROPRIATE SINUS TACHYCARDIA: Primary | ICD-10-CM

## 2025-07-09 DIAGNOSIS — R00.0 TACHYCARDIA: ICD-10-CM

## 2025-07-09 PROCEDURE — 93000 ELECTROCARDIOGRAM COMPLETE: CPT | Performed by: INTERNAL MEDICINE

## 2025-07-09 PROCEDURE — 99213 OFFICE O/P EST LOW 20 MIN: CPT | Performed by: INTERNAL MEDICINE

## 2025-07-09 RX ORDER — SPIRONOLACTONE 25 MG/1
TABLET ORAL
COMMUNITY
Start: 2025-05-06

## 2025-07-09 RX ORDER — PROPRANOLOL HYDROCHLORIDE 10 MG/1
10 TABLET ORAL 3 TIMES DAILY
Qty: 90 TABLET | Refills: 0 | Status: SHIPPED | OUTPATIENT
Start: 2025-07-09

## 2025-07-09 ASSESSMENT — ENCOUNTER SYMPTOMS
CHEST TIGHTNESS: 0
WHEEZING: 0
PHOTOPHOBIA: 0
COUGH: 0
VOMITING: 0
SHORTNESS OF BREATH: 0
NAUSEA: 0
BLOOD IN STOOL: 0
ABDOMINAL PAIN: 0
EYE PAIN: 0
CONSTIPATION: 0
COLOR CHANGE: 0
DIARRHEA: 0
BACK PAIN: 0

## 2025-07-09 NOTE — PROGRESS NOTES
Electrophysiology fu Note      Reason for consultation:  Palpitations, tachycardia    Chief complaint :   FU for tachycardia    Referring physician:       Primary care physician: Colton Junior MD      History of Present Illness:     This visit (7/9/2025)      Chief Complaint   Patient presents with     Follow-Up     Pt denies any new cardiac sx at this time. - Denies chest pain, shortness of breath, palpitations, syncope or presyncope, edema   Pt denies caffeine.  Pt denies tobacco , denies alcohol , denies drug use.  Pt states she does exercise.            Previous visit:  (9/11/2024)      Chief Complaint   Patient presents with    6 Month Follow-Up     Pt denies any new cardiac symptoms.   Denies chest pain, shortness of breath, palpitations, syncope or presyncope, edema            Previous visit: (1/17/2024)      Chief Complaint   Patient presents with    Follow-up     States here for f/u denies any cardiac issues.   Patient feels very good. Denies chest pain, shortness of breath, palpitations, syncope or presyncope, edema   Tolerating medications           Previous visit:  (1/4/2022)      Chief Complaint   Patient presents with    Follow-up     4 mo f/u   Pt states she has palpitations that feels like flutters occasionally but not often. She feels over all low better  No nicotine, alcohol or caffeine  Exercise occasionally     Palpitations           Previous visit: (8/17/2022)      Chief Complaint   Patient presents with    Follow-up     Pt denies other cardiac symptoms.  Pt does not drink or smoke. Pt does not drink caffeine. Pt is getting some exercise.    Palpitations     Random - flutters           Previous visit: (4/20/2022)      Chief Complaint   Patient presents with    Follow-up     3 mo Pt denies any new cardiac sx, no surgeries or procedures scheduled that she is aware of, she will need refills . Pt denies alcohol use and smoking. Patient has

## 2025-07-21 ENCOUNTER — OFFICE VISIT (OUTPATIENT)
Dept: FAMILY MEDICINE CLINIC | Age: 30
End: 2025-07-21
Payer: COMMERCIAL

## 2025-07-21 VITALS
HEIGHT: 64 IN | OXYGEN SATURATION: 100 % | TEMPERATURE: 97.3 F | WEIGHT: 124 LBS | DIASTOLIC BLOOD PRESSURE: 68 MMHG | SYSTOLIC BLOOD PRESSURE: 110 MMHG | BODY MASS INDEX: 21.17 KG/M2 | HEART RATE: 94 BPM

## 2025-07-21 DIAGNOSIS — H69.92 ACUTE DYSFUNCTION OF LEFT EUSTACHIAN TUBE: ICD-10-CM

## 2025-07-21 DIAGNOSIS — R68.89 GENERAL ILL FEELING: Primary | ICD-10-CM

## 2025-07-21 DIAGNOSIS — E03.9 ACQUIRED HYPOTHYROIDISM: ICD-10-CM

## 2025-07-21 DIAGNOSIS — R53.83 FATIGUE, UNSPECIFIED TYPE: ICD-10-CM

## 2025-07-21 DIAGNOSIS — R68.89 GENERAL ILL FEELING: ICD-10-CM

## 2025-07-21 LAB
25(OH)D3 SERPL-MCNC: 42.7 NG/ML
ALBUMIN SERPL-MCNC: 4.3 G/DL (ref 3.4–5)
ALBUMIN/GLOB SERPL: 1.8 {RATIO} (ref 1.1–2.2)
ALP SERPL-CCNC: 57 U/L (ref 40–129)
ALT SERPL-CCNC: 13 U/L (ref 10–40)
ANION GAP SERPL CALCULATED.3IONS-SCNC: 12 MMOL/L (ref 3–16)
AST SERPL-CCNC: 18 U/L (ref 15–37)
BASOPHILS # BLD: 0 K/UL (ref 0–0.2)
BASOPHILS NFR BLD: 0.5 %
BILIRUB SERPL-MCNC: 0.3 MG/DL (ref 0–1)
BUN SERPL-MCNC: 13 MG/DL (ref 7–20)
CALCIUM SERPL-MCNC: 9.2 MG/DL (ref 8.3–10.6)
CHLORIDE SERPL-SCNC: 106 MMOL/L (ref 99–110)
CO2 SERPL-SCNC: 23 MMOL/L (ref 21–32)
CREAT SERPL-MCNC: 0.7 MG/DL (ref 0.6–1.1)
DEPRECATED RDW RBC AUTO: 12.8 % (ref 12.4–15.4)
EOSINOPHIL # BLD: 0.1 K/UL (ref 0–0.6)
EOSINOPHIL NFR BLD: 1.6 %
GFR SERPLBLD CREATININE-BSD FMLA CKD-EPI: >90 ML/MIN/{1.73_M2}
GLUCOSE SERPL-MCNC: 84 MG/DL (ref 70–99)
HCT VFR BLD AUTO: 42 % (ref 36–48)
HGB BLD-MCNC: 14 G/DL (ref 12–16)
LYMPHOCYTES # BLD: 2.7 K/UL (ref 1–5.1)
LYMPHOCYTES NFR BLD: 39.2 %
MAGNESIUM SERPL-MCNC: 1.99 MG/DL (ref 1.8–2.4)
MCH RBC QN AUTO: 31.2 PG (ref 26–34)
MCHC RBC AUTO-ENTMCNC: 33.3 G/DL (ref 31–36)
MCV RBC AUTO: 93.8 FL (ref 80–100)
MONOCYTES # BLD: 0.6 K/UL (ref 0–1.3)
MONOCYTES NFR BLD: 8.3 %
NEUTROPHILS # BLD: 3.5 K/UL (ref 1.7–7.7)
NEUTROPHILS NFR BLD: 50.4 %
PLATELET # BLD AUTO: 180 K/UL (ref 135–450)
PMV BLD AUTO: 11.5 FL (ref 5–10.5)
POTASSIUM SERPL-SCNC: 4.5 MMOL/L (ref 3.5–5.1)
PROT SERPL-MCNC: 6.7 G/DL (ref 6.4–8.2)
RBC # BLD AUTO: 4.47 M/UL (ref 4–5.2)
SODIUM SERPL-SCNC: 141 MMOL/L (ref 136–145)
TSH SERPL DL<=0.005 MIU/L-ACNC: 0.73 UIU/ML (ref 0.27–4.2)
VIT B12 SERPL-MCNC: 428 PG/ML (ref 211–911)
WBC # BLD AUTO: 6.9 K/UL (ref 4–11)

## 2025-07-21 PROCEDURE — 99214 OFFICE O/P EST MOD 30 MIN: CPT | Performed by: PHYSICIAN ASSISTANT

## 2025-07-21 NOTE — PROGRESS NOTES
7/21/2025    Milo [facility]    Chief Complaint   Patient presents with    Illness     - left ear pain, dizziness, physical weakness, chest Feels heavy (hx sinus tachycardia). 1 week.        HPI  History was obtained from patient.   Milo is a 29 y.o. female who presents today with complaints of 1 week hx of general ill feeling, fatigue, lightheadedness, the feeling of chest heaviness, left ear pain and fullness.  She states she feels dehydrated or like her electrolytes are off.  Symptoms started after she flew back from [facility].  She denies any recent or current cold-like symptoms such as nasal congestion, runny nose, sore throat or fevers.  She reports good p.o. intake and urine output.  No UTI symptoms.  She reports chronic diarrhea without baseline change.    She started spironolactone approximately 1 month ago for acne.    She follows Cardiology and EP.  She just followed up with EP Dr Arguello less than 2 weeks ago.  Hx of sinus tach.  She was advised to slowly wean off of Propanolol but has not done so.    Hx of hypothyroidism, on Levothyroxine.  Labs 4 months ago WNL.     REVIEW OF SYMPTOMS    Constitutional:  Admits fatigue.  Denies fever, chills, weight loss   Eyes:  Denies photophobia or vision changes  ENT:  Admits left ear pain and fullness.  Sx come and go.  Denies sinus congestion, runny nose, sore throat.  Denies ear drainage or trauma.   Cardiovascular:  Admits chest heaviness at times.  Denies chest pain, palpitations or swelling.  She does have a hx of palpitations and sinus tach.  Respiratory:  Denies cough or shortness of breath  GI:  Denies abdominal pain, nausea, vomiting, or bowel changes.  Denies melena or hematochezia.  Skin:  No rashes  Neurologic:  Admits generalized headache.  Denies focal weakness, or sensory changes  Psychiatric:  Denies depression.  Reports mood is stable.       PAST MEDICAL HISTORY  Past Medical History:   Diagnosis Date    Abnormal computed tomography angiography of

## 2025-08-06 DIAGNOSIS — E03.9 ACQUIRED HYPOTHYROIDISM: ICD-10-CM

## 2025-08-07 RX ORDER — LEVOTHYROXINE SODIUM 112 UG/1
112 TABLET ORAL DAILY
Qty: 90 TABLET | Refills: 0 | Status: SHIPPED | OUTPATIENT
Start: 2025-08-07

## 2025-08-28 RX ORDER — PROPRANOLOL HYDROCHLORIDE 10 MG/1
10 TABLET ORAL 3 TIMES DAILY
Qty: 90 TABLET | Refills: 0 | Status: SHIPPED | OUTPATIENT
Start: 2025-08-28